# Patient Record
Sex: FEMALE | Race: WHITE | Employment: UNEMPLOYED | URBAN - METROPOLITAN AREA
[De-identification: names, ages, dates, MRNs, and addresses within clinical notes are randomized per-mention and may not be internally consistent; named-entity substitution may affect disease eponyms.]

---

## 2017-03-02 LAB
RPR, EXTERNAL: NON REACTIVE
TYPE, ABO & RH, EXTERNAL: NORMAL

## 2017-03-22 LAB
CHLAMYDIA, EXTERNAL: NEGATIVE
HBSAG, EXTERNAL: NEGATIVE
HIV, EXTERNAL: NEGATIVE
N. GONORRHEA, EXTERNAL: NEGATIVE
RUBELLA, EXTERNAL: NORMAL

## 2017-09-07 LAB — GRBS, EXTERNAL: NEGATIVE

## 2017-09-09 ENCOUNTER — HOSPITAL ENCOUNTER (EMERGENCY)
Age: 37
Discharge: HOME | End: 2017-09-09
Attending: OBSTETRICS & GYNECOLOGY | Admitting: OBSTETRICS & GYNECOLOGY
Payer: COMMERCIAL

## 2017-09-09 VITALS
HEART RATE: 80 BPM | SYSTOLIC BLOOD PRESSURE: 123 MMHG | BODY MASS INDEX: 28.28 KG/M2 | HEIGHT: 66 IN | DIASTOLIC BLOOD PRESSURE: 71 MMHG | WEIGHT: 176 LBS | TEMPERATURE: 98.8 F

## 2017-09-09 LAB
APPEARANCE UR: CLEAR
BILIRUB UR QL: NEGATIVE
COLOR UR: YELLOW
GLUCOSE UR QL STRIP.AUTO: NEGATIVE MG/DL
KETONES UR-MCNC: NEGATIVE MG/DL
LEUKOCYTE ESTERASE UR QL STRIP: NEGATIVE
NITRITE UR QL: NEGATIVE
PH UR: 6.5 [PH] (ref 5–9)
PROT UR QL: NEGATIVE MG/DL
RBC # UR STRIP: ABNORMAL /UL
SERVICE CMNT-IMP: ABNORMAL
SP GR UR: 1.01 (ref 1–1.02)
UROBILINOGEN UR QL: 1 EU/DL (ref 0.2–1)

## 2017-09-09 PROCEDURE — 99282 EMERGENCY DEPT VISIT SF MDM: CPT

## 2017-09-09 PROCEDURE — 59025 FETAL NON-STRESS TEST: CPT

## 2017-09-09 PROCEDURE — 81003 URINALYSIS AUTO W/O SCOPE: CPT

## 2017-09-09 RX ORDER — GLUCOSAMINE SULFATE 1500 MG
POWDER IN PACKET (EA) ORAL DAILY
COMMUNITY

## 2017-09-09 NOTE — IP AVS SNAPSHOT
Kamila Mccoy 
 
 
 [address] [place] [id number] 
[phone] Patient: Jose A Holloway MRN: [id number] DCB:[birth year]11[birth year] You are allergic to the following Not on File Recent Documentation Height Weight BMI OB Status Smoking Status 1.676 m 79.8 kg 28.41 kg/m2 Pregnant Never Smoker Emergency Contacts Name Discharge Info Relation Home Work Mobile GIL JENNINGS  Spouse [3] [phone] About your hospitalization You were admitted on:  N/A You last received care in the:  [place] [address] [facility] You were discharged on:  September 9, 2017 Unit phone number:  [phone] Why you were hospitalized Your primary diagnosis was:  Not on File Providers Seen During Your Hospitalizations Provider Role Specialty Primary office phone Carmen Trevino MD Attending Provider Obstetrics & Gynecology [phone] Your Primary Care Physician (PCP) ** None ** Follow-up Information None Current Discharge Medication List  
  
ASK your doctor about these medications Dose & Instructions Dispensing Information Comments Morning Noon Evening Bedtime  
 cholecalciferol 1,000 unit Cap Commonly known as:  VITAMIN D3 Your last dose was: Your next dose is: Take  by mouth daily. Refills:  0  
     
   
   
   
  
 ferrous sulfate 325 mg (65 mg iron) Cper Your last dose was: Your next dose is: Take  by mouth. Refills:  0  
     
   
   
   
  
 PNV No12-Iron-FA-DSS-OM-3 29 mg iron-1 mg -50 mg Cpkd Your last dose was: Your next dose is: Take  by mouth. Refills:  0 Discharge Instructions Marilyne Lower Contractions: Care Instructions Your Care Instructions [place] Leahy contractions prepare your uterus for labor.  Think of them as a \"warm-up\" exercise that your body does. You may begin to feel them between the 28th and 30th weeks of your pregnancy. But they start as early as the 20th week. [place] Leahy contractions usually occur more often during the ninth month. They may go away when you are active and return when you rest. These contractions are like mild contractions of true labor, but they occur less often. (You feel fewer than 8 in an hour.) They don't cause your cervix to open. It may be hard for you to tell the difference between [facility] contractions and true labor, especially in your first pregnancy. Follow-up care is a key part of your treatment and safety. Be sure to make and go to all appointments, and call your doctor if you are having problems. It's also a good idea to know your test results and keep a list of the medicines you take. How can you care for yourself at home? · Try a warm bath to help relieve muscle tension and reduce pain. · Change positions every 30 minutes. Take breaks if you must sit for a long time. Get up and walk around. · Drink plenty of water, enough so that your urine is light yellow or clear like water. · Taking short walks may help you feel better. Your doctor needs to check any contractions that are getting stronger or closer together. Where can you learn more? Go to http://alexander-pinky.info/. Enter [id number] in the search box to learn more about \"[place] Leahy Contractions: Care Instructions. \" Current as of: March 16, 2017 Content Version: 11.3 © 7530-0233 Exec. Care instructions adapted under license by "[employer] (which disclaims liability or warranty for this information). If you have questions about a medical condition or this instruction, always ask your healthcare professional. [place] [address] any warranty or liability for your use of this information. Counting Your Baby's Kicks: Care Instructions Your Care Instructions Counting your baby's kicks is one way your doctor can tell that your baby is healthy. Most womenespecially in a first pregnancyfeel their baby move for the first time between 16 and 22 weeks. The movement may feel like flutters rather than kicks. Your baby may move more at certain times of the day. When you are active, you may notice less kicking than when you are resting. At your prenatal visits, your doctor will ask whether the baby is active. In your last trimester, your doctor may ask you to count the number of times you feel your baby move. Follow-up care is a key part of your treatment and safety. Be sure to make and go to all appointments, and call your doctor if you are having problems. It's also a good idea to know your test results and keep a list of the medicines you take. How do you count fetal kicks? · A common method of checking your baby's movement is to count the number of kicks or moves you feel in 1 hour. Ten movements (such as kicks, flutters, or rolls) in 1 hour are normal. Some doctors suggest that you count in the morning until you get to 10 movements. Then you can quit for that day and start again the next day. · Pick your baby's most active time of day to count. This may be any time from morning to evening. · If you do not feel 10 movements in an hour, your baby may be sleeping. Wait for the next hour and count again. When should you call for help? Call your doctor now or seek immediate medical care if: 
· You noticed that your baby has stopped moving or is moving much less than normal. 
Watch closely for changes in your health, and be sure to contact your doctor if you have any problems. Where can you learn more? Go to http://alexander-pinky.info/. Enter X926 in the search box to learn more about \"Counting Your Baby's Kicks: Care Instructions. \" Current as of: March 16, 2017 Content Version: 11.3 © 9702-0312 Healthwise, Incorporated. Care instructions adapted under license by [employer] (which disclaims liability or warranty for this information). If you have questions about a medical condition or this instruction, always ask your healthcare professional. [address] any warranty or liability for your use of this information. Discharge Orders None Introducing [facility]! [employer] introduces [employer] patient portal. Now you can access parts of your medical record, email your doctor's office, and request medication refills online. 1. In your internet browser, go to [email or web address] 2. Click on the First Time User? Click Here link in the Sign In box. You will see the New Member Sign Up page. 3. Enter your [employer] Access Code exactly as it appears below. You will not need to use this code after youve completed the sign-up process. If you do not sign up before the expiration date, you must request a new code. · [employer] Access Code: [email or web address] Expires: 12/8/2017  9:03 PM 
 
4. Enter the last four digits of your Social Security Number (xxxx) and Date of Birth (mm/dd/yyyy) as indicated and click Submit. You will be taken to the next sign-up page. 5. Create a [employer] ID. This will be your [employer] login ID and cannot be changed, so think of one that is secure and easy to remember. 6. Create a [employer] password. You can change your password at any time. 7. Enter your Password Reset Question and Answer. This can be used at a later time if you forget your password. 8. Enter your e-mail address. You will receive e-mail notification when new information is available in [address] 9. Click Sign Up. You can now view and download portions of your medical record. 10. Click the Download Summary menu link to download a portable copy of your medical information. If you have questions, please visit the Frequently Asked Questions section of the MyChart website. Remember, MyChart is NOT to be used for urgent needs. For medical emergencies, dial 911. Now available from your iPhone and Android! General Information Please provide this summary of care documentation to your next provider. Patient Signature:  ____________________________________________________________ Date:  ____________________________________________________________  
  
[facility] Provider Signature:  ____________________________________________________________ Date:  ____________________________________________________________

## 2017-09-09 NOTE — IP AVS SNAPSHOT
[address] [place], [place] [address] 
[phone] Patient: Aide Perez MRN: [id number] UDN:8/96/6264 Current Discharge Medication List  
  
ASK your doctor about these medications Dose & Instructions Dispensing Information Comments Morning Noon Evening Bedtime  
 cholecalciferol 1,000 unit Cap Commonly known as:  VITAMIN D3 Your last dose was: Your next dose is: Take  by mouth daily. Refills:  0  
     
   
   
   
  
 ferrous sulfate 325 mg (65 mg iron) Cper Your last dose was: Your next dose is: Take  by mouth. Refills:  0  
     
   
   
   
  
 PNV No12-Iron-FA-DSS-OM-3 29 mg iron-1 mg -50 mg Cpkd Your last dose was: Your next dose is: Take  by mouth. Refills:  0

## 2017-09-09 NOTE — IP AVS SNAPSHOT
Summary of Care Report The Summary of Care report has been created to help improve care coordination. Users with access to [employer] or [address] (Web-based application) may access additional patient information including the Discharge Summary. If you are not currently a [address] user and need more information, please call the number listed below in the [place] [address] section and ask to be connected with Medical Records. Facility Information Name Address Phone [address] [place] [id number] [phone] Patient Information Patient Name Sex  Nestor Lamar ([id number]) Female 1980 Discharge Information Admitting Provider Service Area Unit Hung Hill MD / [phone] 5 [facility] / [phone] Discharge Provider Discharge Date/Time Discharge Disposition Destination (none) (none) (none) (none) Patient Language Language ENGLISH [13] You are allergic to the following Not on File Current Discharge Medication List  
  
ASK your doctor about these medications Dose & Instructions Dispensing Information Comments  
 cholecalciferol 1,000 unit Cap Commonly known as:  VITAMIN D3 Take  by mouth daily. Refills:  0  
   
 ferrous sulfate 325 mg (65 mg iron) Cper Take  by mouth. Refills:  0  
   
 PNV No12-Iron-FA-DSS-OM-3 29 mg iron-1 mg -50 mg Cpkd Take  by mouth. Refills:  0 Follow-up Information None Discharge Instructions Electa Halter Contractions: Care Instructions Your Care Instructions [place] Leahy contractions prepare your uterus for labor. Think of them as a \"warm-up\" exercise that your body does. You may begin to feel them between the 28th and 30th weeks of your pregnancy.  But they start as early as the 20th week. [place] Leahy contractions usually occur more often during the ninth month. They may go away when you are active and return when you rest. These contractions are like mild contractions of true labor, but they occur less often. (You feel fewer than 8 in an hour.) They don't cause your cervix to open. It may be hard for you to tell the difference between [facility] contractions and true labor, especially in your first pregnancy. Follow-up care is a key part of your treatment and safety. Be sure to make and go to all appointments, and call your doctor if you are having problems. It's also a good idea to know your test results and keep a list of the medicines you take. How can you care for yourself at home? · Try a warm bath to help relieve muscle tension and reduce pain. · Change positions every 30 minutes. Take breaks if you must sit for a long time. Get up and walk around. · Drink plenty of water, enough so that your urine is light yellow or clear like water. · Taking short walks may help you feel better. Your doctor needs to check any contractions that are getting stronger or closer together. Where can you learn more? Go to http://alexander-pinky.info/. Enter [phone] in the search box to learn more about \"[place] Leahy Contractions: Care Instructions. \" Current as of: March 16, 2017 Content Version: 11.3 © 6122-4500 Redeemr. Care instructions adapted under license by "[employer]" (which disclaims liability or warranty for this information). If you have questions about a medical condition or this instruction, always ask your healthcare professional. [address] any warranty or liability for your use of this information. Counting Your Baby's Kicks: Care Instructions Your Care Instructions Counting your baby's kicks is one way your doctor can tell that your baby is healthy. Most womenespecially in a first pregnancyfeel their baby move for the first time between 16 and 22 weeks. The movement may feel like flutters rather than kicks. Your baby may move more at certain times of the day. When you are active, you may notice less kicking than when you are resting. At your prenatal visits, your doctor will ask whether the baby is active. In your last trimester, your doctor may ask you to count the number of times you feel your baby move. Follow-up care is a key part of your treatment and safety. Be sure to make and go to all appointments, and call your doctor if you are having problems. It's also a good idea to know your test results and keep a list of the medicines you take. How do you count fetal kicks? · A common method of checking your baby's movement is to count the number of kicks or moves you feel in 1 hour. Ten movements (such as kicks, flutters, or rolls) in 1 hour are normal. Some doctors suggest that you count in the morning until you get to 10 movements. Then you can quit for that day and start again the next day. · Pick your baby's most active time of day to count. This may be any time from morning to evening. · If you do not feel 10 movements in an hour, your baby may be sleeping. Wait for the next hour and count again. When should you call for help? Call your doctor now or seek immediate medical care if: 
· You noticed that your baby has stopped moving or is moving much less than normal. 
Watch closely for changes in your health, and be sure to contact your doctor if you have any problems. Where can you learn more? Go to http://alexander-pinky.info/. Enter N308 in the search box to learn more about \"Counting Your Baby's Kicks: Care Instructions. \" Current as of: March 16, 2017 Content Version: 11.3 © 3442-4664 [employer].  Care instructions adapted under license by [address] (which disclaims liability or warranty for this information). If you have questions about a medical condition or this instruction, always ask your healthcare professional. [place] [address] any warranty or liability for your use of this information. Chart Review Routing History No Routing History on File

## 2017-09-10 NOTE — PROGRESS NOTES
HPI:    Subjective:     [unfilled], 42 y.o.   at 36w6d presents to L&D with c/o abdominal tightening. She denies vaginal bleeding/LOF/ctx. Assessment:  Past Medical History:   Diagnosis Date    Anemia     Essential hypertension     Gestational diabetes     Gestational hypertension      No past surgical history on file. Allergies not on file  Prior to Admission medications    Medication Sig Start Date End Date Taking? Authorizing Provider   PNV No12-Iron-FA-DSS-OM-3 29 mg iron-1 mg -50 mg CPKD Take  by mouth. Yes Historical Provider   cholecalciferol (VITAMIN D3) 1,000 unit cap Take  by mouth daily. Yes Historical Provider   ferrous sulfate 325 mg (65 mg iron) cpER Take  by mouth. Yes Historical Provider        Objective:     Vitals:  Vitals:    17   Temp:  98.8 °F (37.1 °C)   Weight: 79.8 kg (176 lb)    Height: 5' 6\" (1.676 m)         Physical Exam:  Patient without distress. Fundus: soft and non tender  Perineum: blood absent, amniotic fluid absent  Cervical Exam: 2 cm dilated    50% effaced    -2 station    Membranes:  Intact  Fetal Heart Rate: Reactive  Baseline: 130 per minute  Variability: moderate  Accelerations: yes  Decelerations: none  Uterine contractions: none  Cervical exam: Dilated:  2 cm                             Effacement: 50%                             Station: -2  U/A: Urine dipstick shows negative for all components. Assessment/Plan:     Plan: DC home with standard & ER labor precautions. Follow-up in office for routine visit.      Signed By:  Mic Lentz CNM     2017

## 2017-09-10 NOTE — PROGRESS NOTES
Received to L & D unit c/o ctx. , 36+6, GDM-diet control. Assisted to BR to change into a gown.  MAURO Styles CNM paged with quick return call. CNM informed of pt's arrival 36+6 with c/o abdominal tightness , reactive EFM tracing, SVE unchanged from previous exam in the office. Orders fro DC with labor precautions received.  Written and verbal DC instructions given to include Labor/SROM precautions, daily fetal kick counts, continue all prescribed medications and appointments. Verbalizes understanding. All questions answered. Up to get dressed.  DC'd to home ambulatory with .

## 2017-09-10 NOTE — DISCHARGE INSTRUCTIONS
Minor Degree Contractions: Care Instructions  Your Care Instructions  [place] Leahy contractions prepare your uterus for labor. Think of them as a \"warm-up\" exercise that your body does. You may begin to feel them between the 28th and 30th weeks of your pregnancy. But they start as early as the 20th week. [place] Leahy contractions usually occur more often during the ninth month. They may go away when you are active and return when you rest. These contractions are like mild contractions of true labor, but they occur less often. (You feel fewer than 8 in an hour.) They don't cause your cervix to open. It may be hard for you to tell the difference between [place] contractions and true labor, especially in your first pregnancy. Follow-up care is a key part of your treatment and safety. Be sure to make and go to all appointments, and call your doctor if you are having problems. It's also a good idea to know your test results and keep a list of the medicines you take. How can you care for yourself at home? · Try a warm bath to help relieve muscle tension and reduce pain. · Change positions every 30 minutes. Take breaks if you must sit for a long time. Get up and walk around. · Drink plenty of water, enough so that your urine is light yellow or clear like water. · Taking short walks may help you feel better. Your doctor needs to check any contractions that are getting stronger or closer together. Where can you learn more? Go to http://alexander-pinky.info/. Enter [phone] in the search box to learn more about \"[place] Leahy Contractions: Care Instructions. \"  Current as of: March 16, 2017  Content Version: 11.3  © 8262-4173 [employer] Strategy 1. Care instructions adapted under license by [employer] (which disclaims liability or warranty for this information).  If you have questions about a medical condition or this instruction, always ask your healthcare professional. [employer] disclaims any warranty or liability for your use of this information. Counting Your Baby's Kicks: Care Instructions  Your Care Instructions  Counting your baby's kicks is one way your doctor can tell that your baby is healthy. Most women--especially in a first pregnancy--feel their baby move for the first time between 16 and 22 weeks. The movement may feel like flutters rather than kicks. Your baby may move more at certain times of the day. When you are active, you may notice less kicking than when you are resting. At your prenatal visits, your doctor will ask whether the baby is active. In your last trimester, your doctor may ask you to count the number of times you feel your baby move. Follow-up care is a key part of your treatment and safety. Be sure to make and go to all appointments, and call your doctor if you are having problems. It's also a good idea to know your test results and keep a list of the medicines you take. How do you count fetal kicks? · A common method of checking your baby's movement is to count the number of kicks or moves you feel in 1 hour. Ten movements (such as kicks, flutters, or rolls) in 1 hour are normal. Some doctors suggest that you count in the morning until you get to 10 movements. Then you can quit for that day and start again the next day. · Pick your baby's most active time of day to count. This may be any time from morning to evening. · If you do not feel 10 movements in an hour, your baby may be sleeping. Wait for the next hour and count again. When should you call for help? Call your doctor now or seek immediate medical care if:  · You noticed that your baby has stopped moving or is moving much less than normal.  Watch closely for changes in your health, and be sure to contact your doctor if you have any problems. Where can you learn more? Go to http://alexander-pinky.info/.   Enter L120 in the search box to learn more about \"Counting Your Baby's Kicks: Care Instructions. \"  Current as of: March 16, 2017  Content Version: 11.3  © 4813-9184 [employer]. Care instructions adapted under license by [employer] (which disclaims liability or warranty for this information). If you have questions about a medical condition or this instruction, always ask your healthcare professional. [address] any warranty or liability for your use of this information.

## 2017-09-22 ENCOUNTER — ANESTHESIA EVENT (OUTPATIENT)
Dept: LABOR AND DELIVERY | Age: 37
End: 2017-09-22
Payer: COMMERCIAL

## 2017-09-22 ENCOUNTER — HOSPITAL ENCOUNTER (INPATIENT)
Age: 37
LOS: 2 days | Discharge: HOME | End: 2017-09-24
Attending: OBSTETRICS & GYNECOLOGY | Admitting: OBSTETRICS & GYNECOLOGY
Payer: COMMERCIAL

## 2017-09-22 ENCOUNTER — ANESTHESIA (OUTPATIENT)
Dept: LABOR AND DELIVERY | Age: 37
End: 2017-09-22
Payer: COMMERCIAL

## 2017-09-22 PROBLEM — Z33.1 IUP (INTRAUTERINE PREGNANCY), INCIDENTAL: Status: ACTIVE | Noted: 2017-09-22

## 2017-09-22 LAB
ABO + RH BLD: NORMAL
ALBUMIN SERPL-MCNC: 2.7 G/DL (ref 3.4–5)
ALBUMIN/GLOB SERPL: 0.8 {RATIO} (ref 0.8–1.7)
ALP SERPL-CCNC: 157 U/L (ref 45–117)
ALT SERPL-CCNC: 12 U/L (ref 13–56)
ANION GAP SERPL CALC-SCNC: 9 MMOL/L (ref 3–18)
AST SERPL-CCNC: 16 U/L (ref 15–37)
BILIRUB SERPL-MCNC: 0.3 MG/DL (ref 0.2–1)
BLOOD GROUP ANTIBODIES SERPL: NORMAL
BUN SERPL-MCNC: 6 MG/DL (ref 7–18)
BUN/CREAT SERPL: 8 (ref 12–20)
CALCIUM SERPL-MCNC: 8.6 MG/DL (ref 8.5–10.1)
CHLORIDE SERPL-SCNC: 105 MMOL/L (ref 100–108)
CO2 SERPL-SCNC: 26 MMOL/L (ref 21–32)
CREAT SERPL-MCNC: 0.73 MG/DL (ref 0.6–1.3)
ERYTHROCYTE [DISTWIDTH] IN BLOOD BY AUTOMATED COUNT: 14.7 % (ref 11.6–14.5)
GLOBULIN SER CALC-MCNC: 3.5 G/DL (ref 2–4)
GLUCOSE BLD STRIP.AUTO-MCNC: 63 MG/DL (ref 70–110)
GLUCOSE BLD STRIP.AUTO-MCNC: 66 MG/DL (ref 70–110)
GLUCOSE BLD STRIP.AUTO-MCNC: 75 MG/DL (ref 70–110)
GLUCOSE BLD STRIP.AUTO-MCNC: 83 MG/DL (ref 70–110)
GLUCOSE SERPL-MCNC: 69 MG/DL (ref 74–99)
GLUCOSE SERPL-MCNC: 98 MG/DL (ref 74–99)
HCT VFR BLD AUTO: 30.2 % (ref 35–45)
HGB BLD-MCNC: 9.9 G/DL (ref 12–16)
MCH RBC QN AUTO: 25.7 PG (ref 24–34)
MCHC RBC AUTO-ENTMCNC: 32.8 G/DL (ref 31–37)
MCV RBC AUTO: 78.4 FL (ref 74–97)
PLATELET # BLD AUTO: 153 K/UL (ref 135–420)
PMV BLD AUTO: 10.5 FL (ref 9.2–11.8)
POTASSIUM SERPL-SCNC: 2.8 MMOL/L (ref 3.5–5.5)
PROT SERPL-MCNC: 6.2 G/DL (ref 6.4–8.2)
RBC # BLD AUTO: 3.85 M/UL (ref 4.2–5.3)
SODIUM SERPL-SCNC: 140 MMOL/L (ref 136–145)
SPECIMEN EXP DATE BLD: NORMAL
URATE SERPL-MCNC: 4.2 MG/DL (ref 2.6–7.2)
WBC # BLD AUTO: 5.8 K/UL (ref 4.6–13.2)

## 2017-09-22 PROCEDURE — 74011250637 HC RX REV CODE- 250/637: Performed by: NURSE PRACTITIONER

## 2017-09-22 PROCEDURE — 00HU33Z INSERTION OF INFUSION DEVICE INTO SPINAL CANAL, PERCUTANEOUS APPROACH: ICD-10-PCS | Performed by: NURSE PRACTITIONER

## 2017-09-22 PROCEDURE — 75410000000 HC DELIVERY VAGINAL/SINGLE

## 2017-09-22 PROCEDURE — 85027 COMPLETE CBC AUTOMATED: CPT | Performed by: NURSE PRACTITIONER

## 2017-09-22 PROCEDURE — 76060000078 HC EPIDURAL ANESTHESIA

## 2017-09-22 PROCEDURE — 36415 COLL VENOUS BLD VENIPUNCTURE: CPT | Performed by: NURSE PRACTITIONER

## 2017-09-22 PROCEDURE — 74011000250 HC RX REV CODE- 250

## 2017-09-22 PROCEDURE — 74011250636 HC RX REV CODE- 250/636: Performed by: NURSE PRACTITIONER

## 2017-09-22 PROCEDURE — 75410000003 HC RECOV DEL/VAG/CSECN EA 0.5 HR

## 2017-09-22 PROCEDURE — 82947 ASSAY GLUCOSE BLOOD QUANT: CPT

## 2017-09-22 PROCEDURE — 86900 BLOOD TYPING SEROLOGIC ABO: CPT | Performed by: NURSE PRACTITIONER

## 2017-09-22 PROCEDURE — 77030034849

## 2017-09-22 PROCEDURE — 84550 ASSAY OF BLOOD/URIC ACID: CPT | Performed by: NURSE PRACTITIONER

## 2017-09-22 PROCEDURE — 77030007879 HC KT SPN EPDRL TELE -B: Performed by: ANESTHESIOLOGY

## 2017-09-22 PROCEDURE — 75410000002 HC LABOR FEE PER 1 HR

## 2017-09-22 PROCEDURE — 74011250636 HC RX REV CODE- 250/636

## 2017-09-22 PROCEDURE — 65270000029 HC RM PRIVATE

## 2017-09-22 PROCEDURE — 3E0R3CZ INTRODUCE REGIONAL ANESTH IN SPINAL CANAL, PERC: ICD-10-PCS | Performed by: NURSE PRACTITIONER

## 2017-09-22 PROCEDURE — 77010026065 HC OXYGEN MINIMUM MEDICAL AIR

## 2017-09-22 PROCEDURE — 82962 GLUCOSE BLOOD TEST: CPT

## 2017-09-22 PROCEDURE — 80053 COMPREHEN METABOLIC PANEL: CPT | Performed by: NURSE PRACTITIONER

## 2017-09-22 PROCEDURE — 77030018749 HC HK AMNIO DISP DERY -A

## 2017-09-22 RX ORDER — LIDOCAINE HYDROCHLORIDE AND EPINEPHRINE 15; 5 MG/ML; UG/ML
INJECTION, SOLUTION EPIDURAL AS NEEDED
Status: DISCONTINUED | OUTPATIENT
Start: 2017-09-22 | End: 2017-09-22 | Stop reason: HOSPADM

## 2017-09-22 RX ORDER — HYDROMORPHONE HYDROCHLORIDE 2 MG/ML
1 INJECTION, SOLUTION INTRAMUSCULAR; INTRAVENOUS; SUBCUTANEOUS
Status: DISCONTINUED | OUTPATIENT
Start: 2017-09-22 | End: 2017-09-23

## 2017-09-22 RX ORDER — FENTANYL CITRATE 50 UG/ML
INJECTION, SOLUTION INTRAMUSCULAR; INTRAVENOUS
Status: DISPENSED
Start: 2017-09-22 | End: 2017-09-23

## 2017-09-22 RX ORDER — FENTANYL CITRATE 50 UG/ML
100 INJECTION, SOLUTION INTRAMUSCULAR; INTRAVENOUS ONCE
Status: DISCONTINUED | OUTPATIENT
Start: 2017-09-22 | End: 2017-09-23 | Stop reason: HOSPADM

## 2017-09-22 RX ORDER — MINERAL OIL
30 OIL (ML) ORAL AS NEEDED
Status: COMPLETED | OUTPATIENT
Start: 2017-09-22 | End: 2017-09-22

## 2017-09-22 RX ORDER — NALBUPHINE HYDROCHLORIDE 10 MG/ML
10 INJECTION, SOLUTION INTRAMUSCULAR; INTRAVENOUS; SUBCUTANEOUS
Status: DISCONTINUED | OUTPATIENT
Start: 2017-09-22 | End: 2017-09-23 | Stop reason: HOSPADM

## 2017-09-22 RX ORDER — OXYTOCIN IN 5 % DEXTROSE 30/500 ML
.5-2 PLASTIC BAG, INJECTION (ML) INTRAVENOUS
Status: DISCONTINUED | OUTPATIENT
Start: 2017-09-22 | End: 2017-09-23

## 2017-09-22 RX ORDER — LIDOCAINE HYDROCHLORIDE 10 MG/ML
20 INJECTION, SOLUTION EPIDURAL; INFILTRATION; INTRACAUDAL; PERINEURAL AS NEEDED
Status: DISCONTINUED | OUTPATIENT
Start: 2017-09-22 | End: 2017-09-23

## 2017-09-22 RX ORDER — FENTANYL CITRATE 50 UG/ML
INJECTION, SOLUTION INTRAMUSCULAR; INTRAVENOUS AS NEEDED
Status: DISCONTINUED | OUTPATIENT
Start: 2017-09-22 | End: 2017-09-22 | Stop reason: HOSPADM

## 2017-09-22 RX ORDER — FENTANYL/ROPIVACAINE/NS/PF 2MCG/ML-.1
PLASTIC BAG, INJECTION (ML) EPIDURAL
Status: COMPLETED
Start: 2017-09-22 | End: 2017-09-22

## 2017-09-22 RX ORDER — IBUPROFEN 400 MG/1
800 TABLET ORAL
Status: DISCONTINUED | OUTPATIENT
Start: 2017-09-22 | End: 2017-09-24 | Stop reason: HOSPADM

## 2017-09-22 RX ORDER — METHYLERGONOVINE MALEATE 0.2 MG/ML
0.2 INJECTION INTRAVENOUS AS NEEDED
Status: DISCONTINUED | OUTPATIENT
Start: 2017-09-22 | End: 2017-09-23

## 2017-09-22 RX ORDER — OXYCODONE AND ACETAMINOPHEN 5; 325 MG/1; MG/1
2 TABLET ORAL
Status: DISCONTINUED | OUTPATIENT
Start: 2017-09-22 | End: 2017-09-24 | Stop reason: HOSPADM

## 2017-09-22 RX ORDER — BUTORPHANOL TARTRATE 2 MG/ML
2 INJECTION INTRAMUSCULAR; INTRAVENOUS
Status: DISCONTINUED | OUTPATIENT
Start: 2017-09-22 | End: 2017-09-23

## 2017-09-22 RX ORDER — OXYTOCIN/RINGER'S LACTATE 20/1000 ML
125 PLASTIC BAG, INJECTION (ML) INTRAVENOUS CONTINUOUS
Status: DISCONTINUED | OUTPATIENT
Start: 2017-09-22 | End: 2017-09-23 | Stop reason: HOSPADM

## 2017-09-22 RX ORDER — FENTANYL/ROPIVACAINE/NS/PF 2MCG/ML-.1
1-15 PLASTIC BAG, INJECTION (ML) EPIDURAL
Status: DISCONTINUED | OUTPATIENT
Start: 2017-09-22 | End: 2017-09-23 | Stop reason: HOSPADM

## 2017-09-22 RX ORDER — POTASSIUM CHLORIDE 20 MEQ/1
40 TABLET, EXTENDED RELEASE ORAL
Status: COMPLETED | OUTPATIENT
Start: 2017-09-22 | End: 2017-09-22

## 2017-09-22 RX ORDER — TERBUTALINE SULFATE 1 MG/ML
0.25 INJECTION SUBCUTANEOUS
Status: DISCONTINUED | OUTPATIENT
Start: 2017-09-22 | End: 2017-09-23

## 2017-09-22 RX ORDER — OXYTOCIN/RINGER'S LACTATE 20/1000 ML
500 PLASTIC BAG, INJECTION (ML) INTRAVENOUS ONCE
Status: COMPLETED | OUTPATIENT
Start: 2017-09-22 | End: 2017-09-22

## 2017-09-22 RX ORDER — DIPHENHYDRAMINE HYDROCHLORIDE 50 MG/ML
25 INJECTION, SOLUTION INTRAMUSCULAR; INTRAVENOUS
Status: DISCONTINUED | OUTPATIENT
Start: 2017-09-22 | End: 2017-09-23 | Stop reason: HOSPADM

## 2017-09-22 RX ORDER — LIDOCAINE HYDROCHLORIDE 10 MG/ML
INJECTION INFILTRATION; PERINEURAL
Status: DISCONTINUED
Start: 2017-09-22 | End: 2017-09-23

## 2017-09-22 RX ORDER — LIDOCAINE HYDROCHLORIDE 10 MG/ML
INJECTION INFILTRATION; PERINEURAL AS NEEDED
Status: DISCONTINUED | OUTPATIENT
Start: 2017-09-22 | End: 2017-09-22 | Stop reason: HOSPADM

## 2017-09-22 RX ORDER — SODIUM CHLORIDE, SODIUM LACTATE, POTASSIUM CHLORIDE, CALCIUM CHLORIDE 600; 310; 30; 20 MG/100ML; MG/100ML; MG/100ML; MG/100ML
125 INJECTION, SOLUTION INTRAVENOUS CONTINUOUS
Status: DISCONTINUED | OUTPATIENT
Start: 2017-09-22 | End: 2017-09-23

## 2017-09-22 RX ORDER — BUPIVACAINE HYDROCHLORIDE 2.5 MG/ML
INJECTION, SOLUTION EPIDURAL; INFILTRATION; INTRACAUDAL AS NEEDED
Status: DISCONTINUED | OUTPATIENT
Start: 2017-09-22 | End: 2017-09-22 | Stop reason: HOSPADM

## 2017-09-22 RX ORDER — NALBUPHINE HYDROCHLORIDE 10 MG/ML
10 INJECTION, SOLUTION INTRAMUSCULAR; INTRAVENOUS; SUBCUTANEOUS
Status: DISCONTINUED | OUTPATIENT
Start: 2017-09-22 | End: 2017-09-23

## 2017-09-22 RX ORDER — NALOXONE HYDROCHLORIDE 0.4 MG/ML
0.2 INJECTION, SOLUTION INTRAMUSCULAR; INTRAVENOUS; SUBCUTANEOUS AS NEEDED
Status: DISCONTINUED | OUTPATIENT
Start: 2017-09-22 | End: 2017-09-23 | Stop reason: HOSPADM

## 2017-09-22 RX ORDER — OXYTOCIN IN 5 % DEXTROSE 30/500 ML
.5-2 PLASTIC BAG, INJECTION (ML) INTRAVENOUS
Status: CANCELLED | OUTPATIENT
Start: 2017-09-22

## 2017-09-22 RX ORDER — LORATADINE 10 MG/1
10 TABLET ORAL ONCE
Status: COMPLETED | OUTPATIENT
Start: 2017-09-22 | End: 2017-09-22

## 2017-09-22 RX ORDER — ACETAMINOPHEN 325 MG/1
650 TABLET ORAL
Status: DISCONTINUED | OUTPATIENT
Start: 2017-09-22 | End: 2017-09-24 | Stop reason: HOSPADM

## 2017-09-22 RX ADMIN — LORATADINE 10 MG: 10 TABLET ORAL at 20:48

## 2017-09-22 RX ADMIN — BUPIVACAINE HYDROCHLORIDE 4 ML: 2.5 INJECTION, SOLUTION EPIDURAL; INFILTRATION; INTRACAUDAL at 18:19

## 2017-09-22 RX ADMIN — Medication 12 ML/HR: at 18:34

## 2017-09-22 RX ADMIN — LIDOCAINE HYDROCHLORIDE 3 ML: 10 INJECTION INFILTRATION; PERINEURAL at 18:12

## 2017-09-22 RX ADMIN — Medication 2 MILLI-UNITS/MIN: at 20:06

## 2017-09-22 RX ADMIN — SODIUM CHLORIDE, SODIUM LACTATE, POTASSIUM CHLORIDE, AND CALCIUM CHLORIDE 125 ML/HR: 600; 310; 30; 20 INJECTION, SOLUTION INTRAVENOUS at 18:44

## 2017-09-22 RX ADMIN — Medication 10000 MILLI-UNITS/HR: at 22:32

## 2017-09-22 RX ADMIN — POTASSIUM CHLORIDE 40 MEQ: 20 TABLET, EXTENDED RELEASE ORAL at 13:18

## 2017-09-22 RX ADMIN — Medication 30 ML: at 22:19

## 2017-09-22 RX ADMIN — SODIUM CHLORIDE, SODIUM LACTATE, POTASSIUM CHLORIDE, AND CALCIUM CHLORIDE 125 ML/HR: 600; 310; 30; 20 INJECTION, SOLUTION INTRAVENOUS at 16:51

## 2017-09-22 RX ADMIN — SODIUM CHLORIDE, SODIUM LACTATE, POTASSIUM CHLORIDE, AND CALCIUM CHLORIDE 1000 ML: 600; 310; 30; 20 INJECTION, SOLUTION INTRAVENOUS at 12:50

## 2017-09-22 RX ADMIN — LIDOCAINE HYDROCHLORIDE AND EPINEPHRINE 3 ML: 15; 5 INJECTION, SOLUTION EPIDURAL at 18:17

## 2017-09-22 RX ADMIN — FENTANYL CITRATE 100 MCG: 50 INJECTION, SOLUTION INTRAMUSCULAR; INTRAVENOUS at 18:19

## 2017-09-22 NOTE — PROGRESS NOTES
Labor Progress Note  Patient seen, fetal heart rate and contraction pattern evaluated, patient examined. No data found. Physical Exam:  Cervical Exam:  6 cm dilated    80% effaced    -2 station    Membranes:  Artificial Rupture of Membranes; Amniotic Fluid: large amount of clear fluid  Uterine Activity: Frequency: Every 2 minutes  Fetal Heart Rate: Reactive    Assessment/Plan:  Reassuring fetal status, Labor  Progressing normally, Continue plan for vaginal delivery, pt desires epidural and this will be called for.

## 2017-09-22 NOTE — IP AVS SNAPSHOT
[address] [address] 
[phone] Patient: Nerissa Bird MRN: [id number] OHX:0/86/7468 You are allergic to the following No active allergies Recent Documentation Height Weight BMI OB Status Smoking Status 1.676 m 80.3 kg 28.57 kg/m2 Pregnant Never Smoker Emergency Contacts Name Discharge Info Relation Home Work Mobile GIL JENNINGS  Spouse [3] [phone] About your hospitalization You were admitted on:  September 22, 2017 You last received care in the:  [address] [place] [address] You were discharged on:  September 24, 2017 Unit phone number:  [phone] Why you were hospitalized Your primary diagnosis was:  Not on File Your diagnoses also included:  Iup (Intrauterine Pregnancy), Incidental  
  
  
 
  
  
Providers Seen During Your Hospitalizations Provider Role Specialty Primary office phone Tony Galeano MD Attending Provider Obstetrics & Gynecology [phone] Desiree Trejo MD Attending Provider Obstetrics & Gynecology [phone] Your Primary Care Physician (PCP) Primary Care Physician Office Phone Office Fax NONE ** None ** ** None ** Follow-up Information Follow up With Details Comments Contact Info None   None ([phone]) Patient stated that they have no PCP Cesar Ivory CNM Schedule an appointment as soon as possible for a visit in 6 weeks  [address] [place] [address] [address] [place] [address] 
[phone] Current Discharge Medication List  
  
START taking these medications Dose & Instructions Dispensing Information Comments Morning Noon Evening Bedtime  
 ibuprofen 800 mg tablet Commonly known as:  MOTRIN Your last dose was: Your next dose is:    
   
   
 Dose:  800 mg Take 1 Tab by mouth every eight (8) hours as needed. Quantity:  90 Tab Refills:  1 CONTINUE these medications which have NOT CHANGED Dose & Instructions Dispensing Information Comments Morning Noon Evening Bedtime  
 cholecalciferol 1,000 unit Cap Commonly known as:  VITAMIN D3 Your last dose was: Your next dose is: Take  by mouth daily. Refills:  0  
     
   
   
   
  
 ferrous sulfate 325 mg (65 mg iron) Cper Your last dose was: Your next dose is: Take  by mouth. Refills:  0  
     
   
   
   
  
 PNV No12-Iron-FA-DSS-OM-3 29 mg iron-1 mg -50 mg Cpkd Your last dose was: Your next dose is: Take  by mouth. Refills:  0 Where to Get Your Medications Information on where to get these meds will be given to you by the nurse or doctor. ! Ask your nurse or doctor about these medications  
  ibuprofen 800 mg tablet Discharge Instructions POST DELIVERY DISCHARGE INSTRUCTIONS Name: Yojana Tobin YOB: 1980 Primary Diagnosis: Active Problems: 
  IUP (intrauterine pregnancy), incidental (9/22/2017) General:  
 
Diet/Diet Restrictions: 
Eight 8-ounce glasses of fluid daily (water, juices); avoid excessive caffeine intake. Meals/snacks as desired which are high in fiber and carbohydrates and low in fat and cholesterol. Physical Activity / Restrictions / Safety:  
 
Avoid heavy lifting, no more than the baby alone (not the baby in the car seat). Avoid intercourse until you are seen at your postpartum visit. No douching or tampon use. Check with obstetrician before starting or resuming an exercise program.    
 
 
Discharge Instructions/Special Treatment/Home Care Needs:  
 
Continue prenatal vitamins. Continue to use squirt bottle with warm water on your perineum after each bathroom use until bleeding stops. Call your doctor for the following:  
 
Fever over 101 degrees by mouth. Vaginal bleeding that soaks two pads per hour for more than one hour. Red streaks or increased swelling of legs, painful red streaks on your breast. 
If you feel extremely anxious or overwhelmed. If you have thoughts of harming yourself and/or your baby. Pain Management:  
 
Pain Management:  
Take Acetaminophen (Tylenol) or Ibuprofen (Advil, Motrin), as directed for pain. Use a warm Sitz bath 3 times daily to relieve perineal or hemorrhoidal discomfort. For hemorrhoidal discomfort, use Tucks and Anusol cream as needed and directed. Follow-Up Care:  
 
Appointment with MD: Follow-up Appointments Procedures  FOLLOW UP VISIT Appointment in: 6 Weeks Standing Status:   Standing Number of Occurrences:   1 Order Specific Question:   Appointment in Answer:   6 Weeks Telephone number: [phone] [employer] Activation Thank you for requesting access to [facility]. Please follow the instructions below to securely access and download your online medical record. [facility] allows you to send messages to your doctor, view your test results, renew your prescriptions, schedule appointments, and more. How Do I Sign Up? 1. In your internet browser, go to [email or web address]. 2. Click on the First Time User? Click Here link in the Sign In box. You will see the New Member Sign Up page. 3. Enter your [facility] Access Code exactly as it appears below. You will not need to use this code after youve completed the sign-up process. If you do not sign up before the expiration date, you must request a new code. [facility] Access Code: [email or web address] Expires: 2017  9:03 PM (This is the date your [facility] access code will ) 4. Enter the last four digits of your Social Security Number (xxxx) and Date of Birth (mm/dd/yyyy) as indicated and click Submit. You will be taken to the next sign-up page. 5. Create a [facility] ID.  This will be your [facility] login ID and cannot be changed, so think of one that is secure and easy to remember. 6. Create a [facility] password. You can change your password at any time. 7. Enter your Password Reset Question and Answer. This can be used at a later time if you forget your password. 8. Enter your e-mail address. You will receive e-mail notification when new information is available in [address] 9. Click Sign Up. You can now view and download portions of your medical record. 10. Click the Download Summary menu link to download a portable copy of your medical information. Additional Information If you have questions, please visit the Frequently Asked Questions section of the [employer] website at [email or web address] Remember, [facility] is NOT to be used for urgent needs. For medical emergencies, dial 911. Patient armband removed and given to patient to take home. Patient was informed of the privacy risks if armband lost or stolen Signed By: Yasir Zamora, MAYNOR Discharge Orders None Introducing [facility]! Johanna Alberto introduces [facility] patient portal. Now you can access parts of your medical record, email your doctor's office, and request medication refills online. 1. In your internet browser, go to [email or web address] 2. Click on the First Time User? Click Here link in the Sign In box. You will see the New Member Sign Up page. 3. Enter your [facility] Access Code exactly as it appears below. You will not need to use this code after youve completed the sign-up process. If you do not sign up before the expiration date, you must request a new code. · [facility] Access Code: [email or web address] Expires: 12/8/2017  9:03 PM 
 
4. Enter the last four digits of your Social Security Number (xxxx) and Date of Birth (mm/dd/yyyy) as indicated and click Submit.  You will be taken to the next sign-up page. 5. Create a [employer] [facility] ID. This will be your [facility] login ID and cannot be changed, so think of one that is secure and easy to remember. 6. Create a [employer] [facility] password. You can change your password at any time. 7. Enter your Password Reset Question and Answer. This can be used at a later time if you forget your password. 8. Enter your e-mail address. You will receive e-mail notification when new information is available in [address] 9. Click Sign Up. You can now view and download portions of your medical record. 10. Click the Download Summary menu link to download a portable copy of your medical information. If you have questions, please visit the Frequently Asked Questions section of the [employer] [facility] website. Remember, [facility] is NOT to be used for urgent needs. For medical emergencies, dial 911. Now available from your iPhone and Android! General Information Please provide this summary of care documentation to your next provider. Patient Signature:  ____________________________________________________________ Date:  ____________________________________________________________  
  
Beryl Lewis Provider Signature:  ____________________________________________________________ Date:  ____________________________________________________________

## 2017-09-22 NOTE — PROGRESS NOTES
1005 G P at 38+5 weeks gestation sent to triage from physician office for increased blood pressures. States she was 3/80/-3 in office. 1112 SVE 4/90/-3 by this RN  1209 SVE by David Talavera CNM 3-4/50/-3; using bedside ultrasound to determine fetal position. [facility], [facility] pressures, and ordered oral k-dur potassium. Order entered, See [facility] and orders. (Please note, between , patient ambulated in halls in between intermittent fetal monitoring)  1515 Glucose level of 63 (repeated 66). Patient given a popsicle. [facility] notified of patient's glucose. No new orders at this time. 1557 After patient had popsicle, repeat blood glucose was 75  1600 SVE 5/80/-2  1650 AROM by David Talavera CNM for moderate amount clear fluid, SVE 6/80/-2  1655 Patient requesting epidural anesthesia, IV fluid bolus began  1710 Dr. Petra gonzales for epidural placement  1727 Dr. Petra gonzales for epidural placement    1804 Dr. Petra Villegas at bedside explaining epidural procedure, and consent is signed. 1810 Time out performed, Fentanyl handed to Dr. Nilda Brar Procedure begins  1814 Epidural needle being placed  1815 Saline inserted by Dr. Petra Villegas  1816 Epidural catheter threaded into place  1817 Test dose given  1818 Procedure is complete, Taping catheter in place  1820 Fentanyl being administered by Dr. Tabatha Mirza 96 6/80/-2  1835 Blum catheter inserted  1923 Bedside/verbal report given to Sophy Yepez RN.

## 2017-09-22 NOTE — H&P
History & Physical    Name: Aurora Gordon MRN: [id number]  SSN: [id number]    YOB: 1980  Age: 42 y.o. Sex: female        Subjective:     Estimated Date of Delivery: 10/1/17  OB History      Para Term  AB Living    5 4        SAB TAB Ectopic Molar Multiple Live Births                     Ms. Artie Cardona is admitted with pregnancy at 38w5d for active labor. Prenatal course was complicated by R1SLV and AMA. Please see prenatal records for details. No Known Allergies    Objective:     Vitals:  Vitals:    17 1231 17 1302 17 1331 17 1346   BP: 115/75 (!) 100/39 129/79 124/73   Pulse: 76 65 75 82   Resp:       Temp:       Weight:       Height:            Physical Exam:  Patient without distress. Heart: Regular rate and rhythm  Lung: clear to auscultation throughout lung fields, no wheezes, no rales, no rhonchi and normal respiratory effort  Abdomen: soft, nontender  Fundus: soft and non tender  Perineum: blood absent, amniotic fluid absent  Cervical Exam: 4 cm dilated    70% effaced    -3 station    Presenting Part: cephalic  Lower Extremities: no evidence of DVT  presenting part cephalic confirmed by ultrasound  Membranes:  Intact  Fetal Heart Rate & Contraction pattern: Reactive    Prenatal Labs:   No results found for: RUBELLAEXT, GRBSEXT, HBSAGEXT, HIVEXT, RPREXT, GONNOEXT, CHLAMEXT      Assessment/Plan:     Plan: Admit for Reassuring fetal status, Continue plan for vaginal delivery. Group B Strep was negative. Dr. Dimas Mcknight aware of admission.     Signed By:  Yue Rudolph CNM     2017

## 2017-09-22 NOTE — ANESTHESIA PROCEDURE NOTES
Epidural Block    Start time: 9/22/2017 6:10 PM  End time: 9/22/2017 6:16 PM  Performed by: Ashley Francois  Authorized by: Ashley Francois     Pre-Procedure  Indication: at surgeon's request and labor epidural    Preanesthetic Checklist: patient identified, risks and benefits discussed, anesthesia consent, site marked, patient being monitored, timeout performed and anesthesia consent    Timeout Time: 18:10        Epidural:   Patient position:  Seated  Prep region:  Lumbar  Prep: Patient draped and Chlorhexidine    Location:  L3-4    Needle and Epidural Catheter:   Needle Type:  Tuohy  Needle Gauge:  17 G  Injection Technique:  Loss of resistance using air  Attempts:  1  Catheter Size:  20 G  Catheter at Skin Depth (cm):  10  Depth in Epidural Space (cm):  5  Events: no blood with aspiration, no cerebrospinal fluid with aspiration, no paresthesia and negative aspiration test    Test Dose:  Lidocaine 1.5% w/ epi and positive    Assessment:   Catheter Secured:  Tegaderm and tape (filter)  Insertion:  Uncomplicated  Patient tolerance:  Patient tolerated the procedure well with no immediate complications

## 2017-09-22 NOTE — IP AVS SNAPSHOT
Kamila Mccoy 
 
 
 [address] [place] [address] 
[phone] Patient: Jose A Holloway MRN: [id number] CLS:7/36/3068 Current Discharge Medication List  
  
START taking these medications Dose & Instructions Dispensing Information Comments Morning Noon Evening Bedtime  
 ibuprofen 800 mg tablet Commonly known as:  MOTRIN Your last dose was: Your next dose is:    
   
   
 Dose:  800 mg Take 1 Tab by mouth every eight (8) hours as needed. Quantity:  90 Tab Refills:  1 CONTINUE these medications which have NOT CHANGED Dose & Instructions Dispensing Information Comments Morning Noon Evening Bedtime  
 cholecalciferol 1,000 unit Cap Commonly known as:  VITAMIN D3 Your last dose was: Your next dose is: Take  by mouth daily. Refills:  0  
     
   
   
   
  
 ferrous sulfate 325 mg (65 mg iron) Cper Your last dose was: Your next dose is: Take  by mouth. Refills:  0  
     
   
   
   
  
 PNV No12-Iron-FA-DSS-OM-3 29 mg iron-1 mg -50 mg Cpkd Your last dose was: Your next dose is: Take  by mouth. Refills:  0 Where to Get Your Medications Information on where to get these meds will be given to you by the nurse or doctor. ! Ask your nurse or doctor about these medications  
  ibuprofen 800 mg tablet

## 2017-09-22 NOTE — PROGRESS NOTES
Bedside and Verbal shift change report given to Marcelino Ahmadi RN (oncoming nurse) by Byron Peraza, RN (offgoing nurse). Report included the following information SBAR, Kardex, Intake/Output and MAR.      patient resting tilted to right side with peanut ball between legs. Denies needs. At this time.  iupc placed by david bhardwaj     orders received to start pitocin     patient turned to left lateral with peanut ball     7-8/90/-1 to 0 per MAURICIO Pa patient in high fowlers. Us adjusted.  SVE 9/100/0 patient assisted to right lateral us adjusted.  lr bolus     MAURICIO Pa at bedside. Patient assisted to left lateral, US adjusted     O2 at 10L/min     peanut ball placed between legs. DAVID reviewed strip, patient denies needs. [address] at bedside, patient feeling pressure, O2 removed. LR bolus returned to 125ml/hr     patient pushing with Christian Sizer, DAVID and RN at bedside continuously evaluating  [facility] until delivery. 2232  of viable male infant, loose nuchal x1 reduced, shoulders delivered without difficulty, spontaneous cry noted, infant dried and placed skin to skin on mothers chest, delayed cord clamping, cord clamped and cut by FOB. Infant remains skin to skin    2236  of intact normal looking placenta    2240 pericare provided, pads and icepack applied. Patient encouraged to breastfeed within first hour due to GDM, denies need for breastfeeding assistance    2330 pt states she would like to bottle feed until \"more milk comes in\" patient encourage to latch infant, will bring enfamil per patient request.     2335 infant latched to right breast    2344  Patient states infant only stay latched \"for a few min\" supplemented with 15ml of formula    0015 patient assisted to restroom with minimal assistance, unable to void at this time, pericare provided, pad, icepack and gown changed.  Patient returned to bed.     0200 TRANSFER - OUT REPORT:    Verbal report given to Waldo Dozier RN(name) on Sophy Leung  being transferred to [facility] (unit) for routine progression of care       Report consisted of patients Situation, Background, Assessment and   Recommendations(SBAR). Information from the following report(s) SBAR, Kardex, Intake/Output and MAR was reviewed with the receiving nurse. Lines:   Peripheral IV 09/22/17 Left Arm (Active)   Site Assessment Clean, dry, & intact 9/22/2017  7:23 PM   Phlebitis Assessment 0 9/22/2017  7:23 PM   Infiltration Assessment 0 9/22/2017  7:23 PM   Dressing Status Clean, dry, & intact 9/22/2017  7:23 PM   Dressing Type Tape;Transparent 9/22/2017  7:23 PM   Hub Color/Line Status Infusing;Green 9/22/2017  7:23 PM   Alcohol Cap Used Yes 9/22/2017  7:23 PM        Opportunity for questions and clarification was provided.       Patient transported with:   Registered Nurse

## 2017-09-22 NOTE — ANESTHESIA PREPROCEDURE EVALUATION
Anesthetic History   No history of anesthetic complications            Review of Systems / Medical History  Patient summary reviewed, nursing notes reviewed and pertinent labs reviewed    Pulmonary  Within defined limits                 Neuro/Psych   Within defined limits           Cardiovascular    Hypertension            Pertinent negatives: No past MI, CAD, PAD, dysrhythmias, angina and CHF  Exercise tolerance: >4 METS  Comments: Gestational HTN   GI/Hepatic/Renal     GERD        Pertinent negatives: No hepatitis, liver disease and renal disease  Comments: GERD during pregnancy Endo/Other    Diabetes      Pertinent negatives: No hypothyroidism, hyperthyroidism, obesity and blood dyscrasia  Comments: GDM Other Findings              Physical Exam    Airway  Mallampati: II  TM Distance: 4 - 6 cm  Neck ROM: normal range of motion   Mouth opening: Normal     Cardiovascular  Regular rate and rhythm,  S1 and S2 normal,  no murmur, click, rub, or gallop             Dental  No notable dental hx       Pulmonary  Breath sounds clear to auscultation               Abdominal  GI exam deferred       Other Findings            Anesthetic Plan    ASA: 2  Anesthesia type: epidural            Anesthetic plan and risks discussed with: Patient and Spouse      Labor epidural

## 2017-09-22 NOTE — PROGRESS NOTES
Labor Progress Note  Patient seen, fetal heart rate and contraction pattern evaluated, patient examined.   Patient Vitals for the past 1 hrs:   BP Temp Pulse Resp SpO2   09/22/17 1919 - 98 °F (36.7 °C) - 16 -   09/22/17 1918 - - - - 100 %   09/22/17 1913 - - - - 100 %   09/22/17 1908 - - - - 100 %   09/22/17 1903 - - - - 100 %   09/22/17 1900 130/83 - 78 - -   09/22/17 1858 - - - - 95 %   09/22/17 1852 - - - - 100 %       Physical Exam:  Cervical Exam:  6 cm dilated    80% effaced    -2 station    Presenting Part: cephalic  Membranes:  continues to leak clear fluid  Uterine Activity: Frequency: Every q 6 minutes, 30-40mmHg  Fetal Heart Rate: Reactive    Assessment/Plan:  Reassuring fetal status, Labor  Not progressing normally  start pitocin augmentation, Continue plan for vaginal delivery

## 2017-09-22 NOTE — PROGRESS NOTES
Labor Progress Note  Patient seen, fetal heart rate and contraction pattern evaluated, patient examined. No data found. Physical Exam:  Cervical Exam:  6 cm dilated    80% effaced    -2 station    Membranes:  continues to leak clear fluid  Uterine Activity: Frequency: Every 2-4 minutes  Fetal Heart Rate: Reactive    Assessment/Plan:  Continue to plan for vaginal delivery, consider augmentation with pitocin if no change over next 1 hour. Pt contractions are spacing out and she has not made adequate progress.

## 2017-09-23 LAB
HCT VFR BLD AUTO: 28.7 % (ref 35–45)
HGB BLD-MCNC: 9.3 G/DL (ref 12–16)

## 2017-09-23 PROCEDURE — 65270000029 HC RM PRIVATE

## 2017-09-23 PROCEDURE — 85018 HEMOGLOBIN: CPT | Performed by: OBSTETRICS & GYNECOLOGY

## 2017-09-23 PROCEDURE — 85014 HEMATOCRIT: CPT | Performed by: OBSTETRICS & GYNECOLOGY

## 2017-09-23 PROCEDURE — 74011250637 HC RX REV CODE- 250/637: Performed by: NURSE PRACTITIONER

## 2017-09-23 PROCEDURE — 36415 COLL VENOUS BLD VENIPUNCTURE: CPT | Performed by: OBSTETRICS & GYNECOLOGY

## 2017-09-23 RX ORDER — IBUPROFEN 800 MG/1
800 TABLET ORAL
Qty: 90 TAB | Refills: 1 | Status: SHIPPED | OUTPATIENT
Start: 2017-09-23

## 2017-09-23 RX ORDER — PROMETHAZINE HYDROCHLORIDE 25 MG/ML
25 INJECTION, SOLUTION INTRAMUSCULAR; INTRAVENOUS
Status: DISCONTINUED | OUTPATIENT
Start: 2017-09-23 | End: 2017-09-24 | Stop reason: HOSPADM

## 2017-09-23 RX ORDER — ZOLPIDEM TARTRATE 5 MG/1
5 TABLET ORAL
Status: DISCONTINUED | OUTPATIENT
Start: 2017-09-23 | End: 2017-09-24 | Stop reason: HOSPADM

## 2017-09-23 RX ORDER — DOCUSATE SODIUM 100 MG/1
100 CAPSULE, LIQUID FILLED ORAL
Status: DISCONTINUED | OUTPATIENT
Start: 2017-09-23 | End: 2017-09-24 | Stop reason: HOSPADM

## 2017-09-23 RX ORDER — AMOXICILLIN 250 MG
1 CAPSULE ORAL
Status: DISCONTINUED | OUTPATIENT
Start: 2017-09-23 | End: 2017-09-24 | Stop reason: HOSPADM

## 2017-09-23 RX ADMIN — IBUPROFEN 800 MG: 400 TABLET, FILM COATED ORAL at 16:51

## 2017-09-23 RX ADMIN — IBUPROFEN 800 MG: 400 TABLET, FILM COATED ORAL at 06:49

## 2017-09-23 NOTE — PROGRESS NOTES
Bedside and Verbal shift change report given to JOSE Flores RN (oncoming nurse) by Elizabeth Rendon RN (offgoing nurse). Report included the following information SBAR, Kardex, Intake/Output, MAR and Recent Results.

## 2017-09-23 NOTE — PROGRESS NOTES
Progress Note    Patient: Tabatha Puga MRN: [id number]     YOB: 1980  Age: 42 y.o. Subjective:     Postpartum Day: 1    The patient is feeling well. Pain is  well controlled with current medications. Urinary output is adequate. Baby is feeding via bottle without difficulty. Objective:      Patient Vitals for the past 12 hrs:   Temp Pulse Resp BP SpO2   09/23/17 0622 98.2 °F (36.8 °C) 68 16 106/67 100 %   09/23/17 0210 98.8 °F (37.1 °C) 78 18 111/71 100 %   09/23/17 0146 98.4 °F (36.9 °C) 77 14 128/78 -       General:    alert, cooperative, no distress   Lochia:  appropriate   Uterine Fundus:   firm @ umbilicus    Perineum:  Intact    DVT Evaluation:  No evidence of DVT seen on physical exam.  Negative Brianna's sign. Lab/Data Review:  Recent Results (from the past 24 hour(s))   GLUCOSE, POC    Collection Time: 09/22/17  3:10 PM   Result Value Ref Range    Glucose (POC) 63 (L) 70 - 110 mg/dL   GLUCOSE, POC    Collection Time: 09/22/17  3:11 PM   Result Value Ref Range    Glucose (POC) 66 (L) 70 - 110 mg/dL   GLUCOSE, POC    Collection Time: 09/22/17  3:57 PM   Result Value Ref Range    Glucose (POC) 75 70 - 110 mg/dL   GLUCOSE, RANDOM    Collection Time: 09/22/17  6:56 PM   Result Value Ref Range    Glucose 69 (L) 74 - 99 mg/dL   GLUCOSE, POC    Collection Time: 09/22/17  8:09 PM   Result Value Ref Range    Glucose (POC) 83 70 - 110 mg/dL   HEMATOCRIT    Collection Time: 09/23/17  4:46 AM   Result Value Ref Range    HCT 28.7 (L) 35.0 - 45.0 %   HEMOGLOBIN    Collection Time: 09/23/17  4:46 AM   Result Value Ref Range    HGB 9.3 (L) 12.0 - 16.0 g/dL     All lab results for the last 24 hours reviewed. Assessment:     Delivery: spontaneous vaginal delivery    Plan:     Doing well postpartum vaginal delivery. Continue current postpartum care. Encouraged hydration, nutrition and ambulation. Plan DC home tomorrow.     Signed By: Rody Navas CNM     September 23, 2017

## 2017-09-23 NOTE — PROGRESS NOTES
TRANSFER - IN REPORT:    Verbal report received from Magali Berry RN (name) on Peg Speck  being received from L&D (unit) for routine progression of care      Report consisted of patients Situation, Background, Assessment and   Recommendations(SBAR). Information from the following report(s) SBAR, Kardex, Intake/Output, MAR and Recent Results was reviewed with the receiving nurse. Opportunity for questions and clarification was provided. Assessment completed upon patients arrival to unit and care assumed. Patient oriented to postpartum unit, questions and concerns addressed. Green light checklist complete. Infant and FOB at bedside. Call light within reach.

## 2017-09-23 NOTE — PROGRESS NOTES
Delivery Note    Obstetrician:  Henna Moon CNM    Assistant: none    Pre-Delivery Diagnosis: Term pregnancy    Post-Delivery Diagnosis: Living  infant(s) or Male    Intrapartum Event: None    Procedure: Spontaneous vaginal delivery    Epidural: YES    Monitor:  Fetal Heart Tones - External and Uterine Contractions - Internal    Indications for instrumental delivery: none    Estimated Blood Loss:     Episiotomy: none    Laceration(s):  none    Laceration(s) repair: NO    Presentation: Cephalic    Fetal Description: aquino    Fetal Position: Right Occiput Anterior    Birth Weight: unavailable    Birth Length: unavailable    Apgar - One Minute: 9    Apgar - Five Minutes: 9    Umbilical Cord: Nuchal Cord x  1    Specimens: cord blood           Complications:  none           Cord Blood Results:   Information for the patient's :  Kayode Escobar [[id number]]   No results found for: PCTABR, PCTDIG, BILI, ABORH    Prenatal Labs:     Lab Results   Component Value Date/Time    ABO/Rh(D) O POSITIVE 2017 11:22 AM    HBsAg, External Negative 2017    HIV, External Negative 2017    Rubella, External Immune 2017    RPR, External Non reactive 2017    Gonorrhea, External Negative 2017    Chlamydia, External Negative 2017    GrBStrep, External Negative 2017   Spontaneous delivery of viable male infant with single nuchal cord easily reduced over fetal had, intact perineum. Spontaneous cry. Delayed cord clamping then double clamped and cord cut by FOB. Infant with parents in room placed skin to skin.     Attending Attestation: I was present and scrubbed for the entire procedure    Signed By:  Henna Moon CNM     2017

## 2017-09-23 NOTE — ANESTHESIA POSTPROCEDURE EVALUATION
9/23/2017  1:56 PM    Laboring Epidural Follow-up Note     Referring physician: Gema Lynch MD   Patient status post vaginal delivery with labor epidural    Visit Vitals    /67 (BP 1 Location: Right arm, BP Patient Position: At rest)    Pulse 68    Temp 36.8 °C (98.2 °F)    Resp 16    Ht 5' 6\" (1.676 m)    Wt 80.3 kg (177 lb)    SpO2 100%    BMI 28.57 kg/m2       Epidural removed by L&D staff  No sedation, pruritis noted. Adequate analgesia.   No obvious anesthesia complications          Yanira Fink CRNA  Signed By: Yanira Fink CRNA    September 23, 2017

## 2017-09-23 NOTE — PROGRESS NOTES
Labor Progress Note  Patient seen, fetal heart rate and contraction pattern evaluated, patient examined. No data found. Physical Exam:  Cervical Exam:  7 cm dilated    90% effaced    -1 station    Membranes:  leaking clear fluid  Uterine Activity: Frequency: Every 2 minutes  Fetal Heart Rate: Reactive    Assessment/Plan:  Reassuring fetal status, Labor  Progressing normally, Continue plan for vaginal delivery.

## 2017-09-23 NOTE — DISCHARGE INSTRUCTIONS
POST DELIVERY DISCHARGE INSTRUCTIONS    Name: Mason Banuelos  YOB: 1980  Primary Diagnosis: Active Problems:    IUP (intrauterine pregnancy), incidental (9/22/2017)        General:     Diet/Diet Restrictions:  Eight 8-ounce glasses of fluid daily (water, juices); avoid excessive caffeine intake. Meals/snacks as desired which are high in fiber and carbohydrates and low in fat and cholesterol. Physical Activity / Restrictions / Safety:     Avoid heavy lifting, no more than the baby alone (not the baby in the car seat). Avoid intercourse until you are seen at your postpartum visit. No douching or tampon use. Check with obstetrician before starting or resuming an exercise program.         Discharge Instructions/Special Treatment/Home Care Needs:     Continue prenatal vitamins. Continue to use squirt bottle with warm water on your perineum after each bathroom use until bleeding stops. Call your doctor for the following:     Fever over 101 degrees by mouth. Vaginal bleeding that soaks two pads per hour for more than one hour. Red streaks or increased swelling of legs, painful red streaks on your breast.  If you feel extremely anxious or overwhelmed. If you have thoughts of harming yourself and/or your baby. Pain Management:     Pain Management:   Take Acetaminophen (Tylenol) or Ibuprofen (Advil, Motrin), as directed for pain. Use a warm Sitz bath 3 times daily to relieve perineal or hemorrhoidal discomfort. For hemorrhoidal discomfort, use Tucks and Anusol cream as needed and directed. Follow-Up Care:     Appointment with MD:   Follow-up Appointments   Procedures    FOLLOW UP VISIT Appointment in: 6 Weeks     Standing Status:   Standing     Number of Occurrences:   1     Order Specific Question:   Appointment in     Answer:   6 Weeks     Telephone number: [phone]  MyChart Activation    Thank you for requesting access to [address]  Please follow the instructions below to securely access and download your online medical record. [employer] allows you to send messages to your doctor, view your test results, renew your prescriptions, schedule appointments, and more. How Do I Sign Up? 1. In your internet browser, go to [email or web address]. 2. Click on the First Time User? Click Here link in the Sign In box. You will see the New Member Sign Up page. 3. Enter your [employer] Access Code exactly as it appears below. You will not need to use this code after youve completed the sign-up process. If you do not sign up before the expiration date, you must request a new code. [employer] Access Code: [email or web address]  Expires: 2017  9:03 PM (This is the date your [employer] access code will )    4. Enter the last four digits of your Social Security Number (xxxx) and Date of Birth (mm/dd/yyyy) as indicated and click Submit. You will be taken to the next sign-up page. 5. Create a [employer] ID. This will be your [employer] login ID and cannot be changed, so think of one that is secure and easy to remember. 6. Create a [employer] password. You can change your password at any time. 7. Enter your Password Reset Question and Answer. This can be used at a later time if you forget your password. 8. Enter your e-mail address. You will receive e-mail notification when new information is available in [address] 9. Click Sign Up. You can now view and download portions of your medical record. 10. Click the Download Summary menu link to download a portable copy of your medical information. Additional Information    If you have questions, please visit the Frequently Asked Questions section of the [employer] website at [email or web address] Remember, [employer] is NOT to be used for urgent needs. For medical emergencies, dial 911. Patient armband removed and given to patient to take home.   Patient was informed of the privacy risks if armband lost or stolen      Signed By: Cedrick Sesay, DARAM

## 2017-09-24 VITALS
TEMPERATURE: 98 F | SYSTOLIC BLOOD PRESSURE: 116 MMHG | OXYGEN SATURATION: 100 % | WEIGHT: 177 LBS | DIASTOLIC BLOOD PRESSURE: 72 MMHG | HEIGHT: 66 IN | BODY MASS INDEX: 28.45 KG/M2 | HEART RATE: 75 BPM | RESPIRATION RATE: 18 BRPM

## 2017-09-24 PROCEDURE — 74011250637 HC RX REV CODE- 250/637: Performed by: NURSE PRACTITIONER

## 2017-09-24 RX ADMIN — IBUPROFEN 800 MG: 400 TABLET, FILM COATED ORAL at 01:17

## 2017-09-24 NOTE — PROGRESS NOTES
Patient discharged in no apparent distress. Patient has all personal belongings. Patient IV access removed and ID bands kept for her and her baby. Discharge teaching reiterated with patient for her and her baby with opportunity for questions provided. Patient has received and understands all discharge instructions and scripts for her and her baby. Baby's security tag will be removed upon leaving the unit. Patient advised to call [phone] [facility] first thing in the morning to make a follow up appointment to continue to monitor bilirubin levels per Dr Lisa Ahn. Patient will be escorted off of unit by staff member and  via wheelchair with baby on lap.

## 2017-09-24 NOTE — ROUTINE PROCESS
Discharge teaching given. Pt verbalizes understanding and have no question at this time. Opportunity to ask question given. 0730- Bedside and Verbal shift change report given to CHARAN Delcid RN (oncoming nurse) by Yelena SOLARES (offgoing nurse). Report included the following information SBAR, Kardex, Intake/Output and MAR.

## 2017-09-24 NOTE — ROUTINE PROCESS
Bedside and Verbal shift change report given to ARMANDO Cruz RN (oncoming nurse) by L. Suzette Sandhoff, RN (offgoing nurse). Report included the following information SBAR, Kardex, Intake/Output, MAR and Recent Results.

## 2022-03-19 PROBLEM — Z33.1 IUP (INTRAUTERINE PREGNANCY), INCIDENTAL: Status: ACTIVE | Noted: 2017-09-22

## 2023-02-01 ENCOUNTER — HOSPITAL ENCOUNTER (OUTPATIENT)
Dept: PHYSICAL THERAPY | Age: 43
Discharge: HOME | End: 2023-02-01
Payer: COMMERCIAL

## 2023-02-01 PROCEDURE — 97535 SELF CARE MNGMENT TRAINING: CPT

## 2023-02-01 PROCEDURE — 97110 THERAPEUTIC EXERCISES: CPT

## 2023-02-01 PROCEDURE — 97162 PT EVAL MOD COMPLEX 30 MIN: CPT

## 2023-02-01 NOTE — PROGRESS NOTES
[facility]  [address] [place], [address] [place]  Ph [phone]  Fx [phone]    Plan of Care/ Statement of Necessity for Physical Therapy Services    Patient name: Zain Alberto Start of Care: 2023   Referral source: Jonah Hayden MD : 1980    Medical Diagnosis: Stress incontinence (female) (male) [N39.3]   Onset Date:2017    Treatment Diagnosis: Stress incontinence (female) (male) [N39.3]   Prior Hospitalization: see medical history Provider#: [id number]   Medications: Verified on Patient summary List    Comorbidities:  5 pregnancy/vaginal with episiotomy   Prior Level of Function: Active lifestyle, no UI          The Plan of Care and following information is based on the information from the initial evaluation. Assessment/ key information:  Patient is a 38year old female presenting to Physical Therapy with c/o stress urinary incontinence which is limiting ability function at previous level. Patient has minimal pain complaints with palpation of the left pubococcygeus and right ischiococcygeus. Patient presents with decreased strength, coordination, and endurance of the pelvic floor muscles and decreased strength of postural stabilizers. Patient also presents with MYRTLE and increased infrasternal angle restrictions. Patient presents with limited understanding of bladder and bowel anatomy/function, dietary irritants, and urge suppression. I feel patient would benefit from skilled therapeutic intervention to optimize highest functional level possible. Patient will continue to benefit from skilled PT services to modify and progress therapeutic interventions, address functional mobility deficits, address ROM deficits, address strength deficits, analyze and address soft tissue restrictions, analyze and cue movement patterns, analyze and modify body mechanics/ergonomics, and assess and modify postural abnormalities to attain remaining goals.     Evaluation Complexity History HIGH Complexity :3+ comorbidities / personal factors will impact the outcome/ POC ; Examination HIGH Complexity : 4+ Standardized tests and measures addressing body structure, function, activity limitation and / or participation in recreation  ;Presentation MEDIUM Complexity : Evolving with changing characteristics  ; Clinical Decision Making MEDIUM Complexity : FOTO score of 26-74 FOTO score = an established functional score where 100 = no disability  Overall Complexity Rating: MEDIUM    Problem List: Decreased pelvic floor mm awareness, Decreased pelvic floor mm strength, and Use of accessory muscles  Treatment Plan may include any combination of the following: Therapeutic exercise, Neuromuscular re-education, Manual therapy, Physical agent/modality, and Patient education  Patient / Family readiness to learn indicated by: asking questions, trying to perform skills, and interest  Persons(s) to be included in education: patient (P)  Barriers to Learning/Limitations: None  Measures taken if barriers to learning: none  Patient Goal (s):  I would like to stop the incontinence  Patient Self Reported Health Status: excellent  Rehabilitation Potential: excellent    Short term goals: To be achieved in 6 weeks:  Patient will demonstrate proper dietary/fluid habits and urge suppression strategies that promote bladder and bowel health to aid in management of urinary urgency and incontinence. Status at eval: Patient consuming insufficient water and unaware of bladder fitness, dietary irritants and urge suppression strategies. Patient will report improvement in UI complaints evidence by a decrease in pad usage and/or amount of leakage by 50% to improve QOL. Status at eval: Patient using 4 pads (pantiliners) per day, generally damp (amount of leakage).  Mostly she changes pantiliners for hygiene not due to leakage     Patient will be able to maintain pelvic floor contraction for 10 seconds, 10 repetitions with no accessory substitution or breath holding to increase pelvic floor endurance to decrease UI  Status at eval: PFMC 8 seconds, 7 repetitions with gluteal substitutions and breath holding     Long term goals: To be achieved in 12 weeks:  Patient will report bladder continence 75% of the time with cough/sneeze/laugh and walking to the toilet. Status at eval: patient stress and urgency incontinence 3-4 times  per week     Patient will demonstrate pelvic floor muscle contraction 4/5 and ability to maintain contraction for 10 seconds, 10 repetitions. Status at eval: PFM 3+/5, 8 second hold, 7 repetitions, with gluteal substitution     Patient will demonstrate improvement of current complaints evidenced by a 9 point  improvement in FOTO, Pelvic functional disability index score  Status at Eval: Pelvic functional disability index 52     Patient will demonstrate independence with management tools and exercise program that are beneficial for current condition in order to feel comfortable with Pelvic floor PT D/C and not fear exacerbation of current condition or symptoms returning. Status at eval : patient is unaware of what exercises to do to decrease the symptoms and unaware of what activities to avoid to avoid exacerbation of current condition  Frequency / Duration: Patient to be seen 1 times per week for 12 weeks. Patient/ Caregiver education and instruction: Diagnosis, prognosis, Proper Voiding Habits, Diet, Exercises, and Bladder Retraining   [x]  Plan of care has been reviewed with PTA    Certification Period: justino Chavez, PT 2/1/2023 5:55 PM    ________________________________________________________________________    I certify that the above Therapy Services are being furnished while the patient is under my care. I agree with the treatment plan and certify that this therapy is necessary.     [address] [place] [address] Signature:____________________  Date:____________Time:____________ Brodie Nyhan, MD      Please sign and return to [facility]  [address] [place], [address] [place]  Ph [phone]  Fx [phone]

## 2023-02-01 NOTE — PROGRESS NOTES
Physical Therapy Evaluation        Patient Name: Matthew Chatman  Date:2023  : 1980  [x]  Patient  Verified  Payor: Olivia Scott / Plan: [address] [place] [address] HMO/CHOICE PLUS/POS / Product Type: HMO /    In time:300  Out time:355  Total Treatment Time (min): 54  Visit #: 1 of 12    Medicare/BCBS Only   Total Timed Codes (min):  55 1:1 Treatment Time:  54       Treatment Area: Stress incontinence (female) (male) [N39.3]    SUBJECTIVE  Pain Level (0-10 scale): 0/10  []constant []intermittent []improving []worsening []no change since onset    Any medication changes, allergies to medications, adverse drug reactions, diagnosis change, or new procedure performed?: [x] No    [] Yes (see summary sheet for update)  Subjective functional status/changes:     PLOF: Active lifestyle, no UI  Limitations to PLOF: 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  Mechanism of Injury: pregnancy  2017  Current symptoms/Complaints: leakage with laugh/cough/sneeze  Previous Treatment/Compliance: none  PMHx/Surgical Hx: 5 pregnancy/vaginal with episiotomy,   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[place] [address]  Living Situation: live with  and 5 children  Pt Goals: I would like to stop the incontinence  Barriers: []pain []financial []time []transportation []other  Motivation: very  Substance use: []Alcohol []Tobacco []other:   Cognition: A & O x 3    Other:    Current urinary complaint  leaks with activity (stress induced)  stress incontinence    Bladder complaint longevity:  5+ years    Bladder symptom progression:  the same maybe better since she started doing Kegels    Frequency of UI: 3-4 times per week    Pad use:  pantiliners: 4- 4 per day    Pad wetness when changed:  damp    Daytime urinary frequency:  Every 4 hour(s) during the day    Nocturia:  1x/ night    Patient has failed previous pelvic floor muscle training?   [] Yes    [x] No    Bowel function:  Regular BM, 5-7 per week  Stool Type ([place]): 4  Toileting position/modifications: none    Fecal Incontinence present? none    Pain complaint:  Right flank    Pain scale:  3/10 at worst - relates this to lack of water    Diet:about 2 servings of fruit or veg, 1 serving of dairy, 1 serving of grains, 3 servings of protein    Fluid intake:    Fluid  Amount per day   Water 64  ( 79 ounces)   Caffeine none   Alcohol none               Other Tests / Comments: MYRTLE assessment: with head raise 3 fingers at umbilicus; 1\" above umbilicus 2 fingers; 1\" below umbilicus 2 fingers; poor tensile force noted and no abdominal doming noted with transfers/head raise     Pelvic Floor Assessment  Patient was educated on pelvic floor anatomy, structure, and function and implications for current presentation of s/s. Patient consents to pelvic floor assessment.        External trigger point/ muscle tenderness:    Superficial PFM tenderness/restriction   Right/center Left   Bulbocavernosus (bulbospongiosus) none none   Ischiocavernosus none none   Superficial Transverse Perineal none none   Perineal body 1-2/10    Levator Ani none none              PFM Screen:    Skin Integrity:  [x] Healthy [] Red  [] Labia Atrophy [] Fragile    Sensation: [x] Intact [] Diminished:    Muscle Bulk: [x] Symmetrical  [] Well-developed [] Atrophied:  []L   []R   []B    Prolapse: None noted  Ability to actively bulge: slight  Bulge with cough slight; bulge no with knack prior to cough    Pelvic floor manual exam: Performed via internal digitalvaginal assessment  female-upon vaginal palpation of the pelvic floor, the following was noted: good pelvic floor tone and tension with no pain upon palpation   Introitus restriction/TTP (reported as hands on a clock):   Mild TTP/restriction: 4,6,8 o'clock no pain        Deep PFM Tenderness/Restriction:    Right/center Left   pubococcygeus none 1/10   Ischiococcygeus 2/10 none   Iliococcygeus none none   Obturator Internus none none coccyx none             Pelvic floor MMT    PERF (Performance/Endurance/Repetitions//Flicks): 0+/1/7//6  gluteal substitution noted    Pelvic muscle release pattern  3 - good release          25 min [x]Eval                  []Re-Eval       10 min Self Care: Review and handout provided on the following: PFM anatomy, structure and function as it pertains to current s/s, complaints and condition. Reviewed expectations for PFPT and POC. Bladder fitness, HEP, KPF margot   Rationale:  Increase awareness and understanding of current condition to improve patients ability to independently and effectively attain goals and progress towards long term management of current condition. 25 min Therapeutic Exercise:  [] See flow sheet :   Rationale: increase ROM, increase strength, and improve coordination to improve the patients ability to maintain continence              With   [] TE   [] TA   [] neuro   [] other: Patient Education: [x] Review HEP    [] Progressed/Changed HEP based on:   [] positioning   [] body mechanics   [] transfers   [] heat/ice application    [] other:           Pain Level (0-10 scale) post treatment: 0/10    ASSESSMENT/Changes in Function: Patient is a 38year old female presenting to Physical Therapy with c/o stress urinary incontinence which is limiting ability function at previous level. Patient has minimal pain complaints with palpation of the left pubococcygeus and right ischiococcygeus. Patient presents with decreased strength, coordination, and endurance of the pelvic floor muscles and decreased strength of postural stabilizers. Patient also presents with MYRTLE and increased infrasternal angle restrictions. Patient presents with limited understanding of bladder and bowel anatomy/function, dietary irritants, and urge suppression. I feel patient would benefit from skilled therapeutic intervention to optimize highest functional level possible.      Patient will continue to benefit from skilled PT services to modify and progress therapeutic interventions, address functional mobility deficits, address ROM deficits, address strength deficits, analyze and address soft tissue restrictions, analyze and cue movement patterns, analyze and modify body mechanics/ergonomics, and assess and modify postural abnormalities to attain remaining goals. [x]  See Plan of Care  []  See progress note/recertification  []  See Discharge Summary         Progress towards goals / Updated goals:  Short term goals: To be achieved in 6 weeks:  Patient will demonstrate proper dietary/fluid habits and urge suppression strategies that promote bladder and bowel health to aid in management of urinary urgency and incontinence. Status at eval: Patient consuming insufficient water and unaware of bladder fitness, dietary irritants and urge suppression strategies. Patient will report improvement in UI complaints evidence by a decrease in pad usage and/or amount of leakage by 50% to improve QOL. Status at eval: Patient using 4 pads (pantiliners) per day, generally damp (amount of leakage). Mostly she changes pantiliners for hygiene not due to leakage    Patient will be able to maintain pelvic floor contraction for 10 seconds, 10 repetitions with no accessory substitution or breath holding to increase pelvic floor endurance to decrease UI  Status at eval: PFMC 8 seconds, 7 repetitions with gluteal substitutions and breath holding    Long term goals: To be achieved in 12 weeks:  Patient will report bladder continence 75% of the time with cough/sneeze/laugh and walking to the toilet. Status at eval: patient stress and urgency incontinence 3-4 times  per week    Patient will demonstrate pelvic floor muscle contraction 4/5 and ability to maintain contraction for 10 seconds, 10 repetitions.    Status at eval: PFM 3+/5, 8 second hold, 7 repetitions, with gluteal substitution    Patient will demonstrate improvement of current complaints evidenced by a 9 point  improvement in FOTO, Pelvic functional disability index score  Status at Eval: Pelvic functional disability index 52    Patient will demonstrate independence with management tools and exercise program that are beneficial for current condition in order to feel comfortable with Pelvic floor PT D/C and not fear exacerbation of current condition or symptoms returning.    Status at eval : patient is unaware of what exercises to do to decrease the symptoms and unaware of what activities to avoid to avoid exacerbation of current condition    PLAN  []  Upgrade activities as tolerated     [x]  Continue plan of care  []  Update interventions per flow sheet       []  Discharge due to:_  []  Other:_      Cristhian Chung, PT 2/1/2023  1:29 PM

## 2023-02-10 ENCOUNTER — HOSPITAL ENCOUNTER (OUTPATIENT)
Dept: PHYSICAL THERAPY | Age: 43
Discharge: HOME | End: 2023-02-10
Payer: COMMERCIAL

## 2023-02-10 PROCEDURE — 97112 NEUROMUSCULAR REEDUCATION: CPT

## 2023-02-10 PROCEDURE — 97110 THERAPEUTIC EXERCISES: CPT

## 2023-02-10 PROCEDURE — 97530 THERAPEUTIC ACTIVITIES: CPT

## 2023-02-10 PROCEDURE — 97535 SELF CARE MNGMENT TRAINING: CPT
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
## 2023-02-17 ENCOUNTER — APPOINTMENT (OUTPATIENT)
Facility: HOSPITAL | Age: 43
End: 2023-02-17
Payer: COMMERCIAL

## 2023-02-17 ENCOUNTER — HOSPITAL ENCOUNTER (OUTPATIENT)
Facility: HOSPITAL | Age: 43
Setting detail: RECURRING SERIES
End: 2023-02-17
Payer: COMMERCIAL

## 2023-02-24 ENCOUNTER — APPOINTMENT (OUTPATIENT)
Facility: HOSPITAL | Age: 43
End: 2023-02-24
Payer: COMMERCIAL

## 2023-02-24 ENCOUNTER — HOSPITAL ENCOUNTER (OUTPATIENT)
Facility: HOSPITAL | Age: 43
Setting detail: RECURRING SERIES
Discharge: HOME | End: 2023-02-27
Payer: COMMERCIAL

## 2023-02-24 PROCEDURE — 97530 THERAPEUTIC ACTIVITIES: CPT

## 2023-02-24 PROCEDURE — 97112 NEUROMUSCULAR REEDUCATION: CPT

## 2023-02-24 PROCEDURE — 97110 THERAPEUTIC EXERCISES: CPT

## 2023-02-24 NOTE — PROGRESS NOTES
PHYSICAL / OCCUPATIONAL THERAPY - DAILY TREATMENT NOTE (updated )    Patient Name: Brii Asa    Date: 2023    : 1980  Insurance: Payor: Cruz Almanza / Plan: Cruz Almanza / Product Type: *No Product type* /      Patient  verified Yes     Visit #   Current / Total 3 12   Time   In / Out [phone]   Pain   In / Out 0/10 0/10   Subjective Functional Status/Changes: Patient reports no leakage in past 3 weeks   Changes to:  Meds, Allergies, Med Hx, Sx Hx? If yes, update Summary List no       TREATMENT AREA =  Stress incontinence (female) (male) [N39.3]    OBJECTIVE        Therapeutic Procedures: Tx Min Billable or 1:1 Min (if diff from Tx Min) Procedure, Rationale, Specifics   25 25 [id number] Neuromuscular Re-Education (timed):  improve balance, coordination, kinesthetic sense, posture, core stability and proprioception to improve patient's ability to develop conscious control of individual muscles and awareness of position of extremities in order to progress to PLOF and address remaining functional goals. (see flow sheet as applicable)     Details if applicable:        [id number] Therapeutic Activity (timed):  use of dynamic activities replicating functional movements to increase ROM, strength, coordination, balance, and proprioception in order to improve patient's ability to progress to PLOF and address remaining functional goals. (see flow sheet as applicable)     Details if applicable:      76381 Therapeutic Exercise (timed):  increase ROM, strength, coordination, balance, and proprioception to improve patient's ability to progress to PLOF and address remaining functional goals.  (see flow sheet as applicable)     Details if applicable:            Details if applicable:            Details if applicable:     53 48 [facility] Totals Reminder: bill using total billable min of TIMED therapeutic procedures (example: do not include dry needle or estim unattended, both untimed codes, in totals to left)  8-22 min = 1 unit; 23-37 min = 2 units; 38-52 min = 3 units; 53-67 min = 4 units; 68-82 min = 5 units   Total Total     [x]  Patient Education billed concurrently with other procedures   [x] Review HEP    [] Progressed/Changed HEP, detail:    [] Other detail:       Objective Information/Functional Measures/Assessment  Biofeedback expectations and implications were reviewed with patient prior to intervention,   Performed sEMG with perianal electrodes as follows:    Position, initial resting tone Work/Rest/Reps Comments   Sitting   10/10/10     Ave Resting at 5.4   uV    Min resting at 1.8    uV    Ave Work at   21.9     uV    Max Work at [employer] interval 16.53 uV   Supine   2/4/10   Ave Resting at 9.3   uV    Min resting at  2.4   [place] Work at   19.6     uV    Max Work at 35.3 uV    W-R interval 10.28 uV    Baseline prior to exercise: Ave resting= 1.1   uV;  Min resting=  0.3 uV; Max resting= 3.5  uV  Baseline after long holds: Ave resting= 3.7 uV ; Min resting=   2.0  uV; Max resting= 9.8 uV      Patient is a 38year old female who has attended 3 physical therapy appointments for urinary incontinence. She reports no UI in past 3 weeks and is no longer using pantiliners. Surface EMG study demonstrated an excellent ability to relax her pelvic floor in supine and good ability in sitting. Patient demonstrates good endurance and decreased coordination of work to relax.   Patient is progressing very well and it is anticipated that she will discharge soon    Patient will continue to benefit from skilled PT / OT services to modify and progress therapeutic interventions, analyze and address functional mobility deficits, analyze and address ROM deficits, analyze and address strength deficits, analyze and address soft tissue restrictions, analyze and cue for proper movement patterns, and analyze and modify for postural abnormalities to address functional deficits and attain remaining goals.    Progress toward goals / Updated goals:  []  See Progress Note/Recertification    Short term goals: To be achieved in 6 weeks:  Patient will demonstrate proper dietary/fluid habits and urge suppression strategies that promote bladder and bowel health to aid in management of urinary urgency and incontinence. Status at eval: Patient consuming insufficient water and unaware of bladder fitness, dietary irritants and urge suppression strategies. Current: patient has cut out chocolate from diet and denies leaks over this past week PROGRESSING 2/10/23  Current:  patient reports drinking about 64 ounces daily. No Change. 2/24/2023     Patient will report improvement in UI complaints evidence by a decrease in pad usage and/or amount of leakage by 50% to improve QOL. Status at eval: Patient using 4 pads (pantiliners) per day, generally damp (amount of leakage). Mostly she changes pantiliners for hygiene not due to leakage  Current:  Patient reports no leakage in three weeks and has not used pantiliners. GOAL MET 2/24/2023     Patient will be able to maintain pelvic floor contraction for 10 seconds, 10 repetitions with no accessory substitution or breath holding to increase pelvic floor endurance to decrease UI  Status at eval: [facility] 8 seconds, 7 repetitions with gluteal substitutions and breath holding  Current: Surface EMG study demonstrated an excellent ability to relax her pelvic floor in supine and good ability in sitting. Patient demonstrates good endurance, good motor unit recruitment, and decreased coordination of work to relax. 2/24/2023 Progressing     Long term goals: To be achieved in 12 weeks:  Patient will report bladder continence 75% of the time with cough/sneeze/laugh and walking to the toilet. Status at eval: patient stress and urgency incontinence 3-4 times  per week  Current:  Patient reports no leakage in three weeks and has not used pantiliners. Will maintain goal until consistent. 2023 Progressing     Patient will demonstrate pelvic floor muscle contraction 4/5 and ability to maintain contraction for 10 seconds, 10 repetitions. Status at eval: PFM 3+/5, 8 second hold, 7 repetitions, with gluteal substitution  Current: Surface EMG study demonstrated an excellent ability to relax her pelvic floor in supine and good ability in sitting. Patient demonstrates good endurance, good motor unit recruitment, and decreased coordination of work to relax. 2023 Progressing     Patient will demonstrate improvement of current complaints evidenced by a 9 point  improvement in FOTO, Pelvic functional disability index score  Status at Eval: Pelvic functional disability index 52  Current: Will test at the 5th appointment. 2023     Patient will demonstrate independence with management tools and exercise program that are beneficial for current condition in order to feel comfortable with Pelvic floor PT D/C and not fear exacerbation of current condition or symptoms returning. Status at eval : patient is unaware of what exercises to do to decrease the symptoms and unaware of what activities to avoid to avoid exacerbation of current condition  Current:  Patient reports good compliance with HEP.   Progressing 2023      Frequency / Duration: Patient to be seen 1 times per week for 9 weeks    PLAN  Yes  Continue plan of care  []  Upgrade activities as tolerated  []  Discharge due to :  []  Other:    Wenceslao Tsai, PT    2023    9:29 AM    Future Appointments   Date Time Provider Gui Osorio   2023 10:00 AM Everson , PT [facility]   3/3/2023 10:00 AM Everson , PT [facility]   3/10/2023 10:00 AM Everson , PT [facility]   3/17/2023 10:30 AM Basilia Lynn PTA [facility]   3/24/2023 10:00 AM Everson Fonda, PT [facility]   3/31/2023 10:00 AM Wenceslao Fonda, PT [facility]

## 2023-02-24 NOTE — PROGRESS NOTES
[facility]  [address] [place], [address] [facility] [place]  Ph [phone]  Fx [phone]    Physical Therapy Progress Note  Patient name: Brii Israel Start of Care: 2023   Referral source: Ron Gómez MD : 1980                Medical Diagnosis: Stress incontinence (female) (male) [N39.3]    Onset Date:2017                Treatment Diagnosis: Stress incontinence (female) (male) [N39.3]   Prior Hospitalization: see medical history Provider#: [id number]   Medications: Verified on Patient summary List    Comorbidities:  5 pregnancy/vaginal with episiotomy   Prior Level of Function: Active lifestyle, no UI    Visits from Start of Care: 3    Missed Visits: 1    Updated Goals/Measure of Progress: To be achieved in 9 weeks:    Short term goals: To be achieved in 4 weeks:  Patient will demonstrate proper dietary/fluid habits and urge suppression strategies that promote bladder and bowel health to aid in management of urinary urgency and incontinence. Status at [facility]: Patient consuming insufficient water and unaware of bladder fitness, dietary irritants and urge suppression strategies. Current: patient has cut out chocolate from diet and denies leaks over this past week PROGRESSING 2/10/23  Current:  patient reports drinking about 64 ounces daily. No Change. 2023     Patient will report improvement in UI complaints evidence by a decrease in pad usage and/or amount of leakage by 50% to improve QOL. Status at [facility]: Patient using 4 pads (pantiliners) per day, generally damp (amount of leakage). Mostly she changes pantiliners for hygiene not due to leakage  Current:  Patient reports no leakage in three weeks and has not used pantiliners.   GOAL MET 2023     Patient will be able to maintain pelvic floor contraction for 10 seconds, 10 repetitions with no accessory substitution or breath holding to increase pelvic floor endurance to decrease UI  Status at [facility]: [facility] 8 seconds, 7 repetitions with gluteal substitutions and breath holding  Current: Surface EMG study demonstrated an excellent ability to relax her pelvic floor in supine and good ability in sitting. Patient demonstrates good endurance, good motor unit recruitment, and decreased coordination of work to relax. 2/24/2023 Progressing     Long term goals: To be achieved in 9 weeks:  Patient will report bladder continence 75% of the time with cough/sneeze/laugh and walking to the toilet. Status at eval: patient stress and urgency incontinence 3-4 times  per week  Current:  Patient reports no leakage in three weeks and has not used pantiliners. Will maintain goal until consistent. 2/24/2023 Progressing     Patient will demonstrate pelvic floor muscle contraction 4/5 and ability to maintain contraction for 10 seconds, 10 repetitions. Status at eval: PFM 3+/5, 8 second hold, 7 repetitions, with gluteal substitution  Current: Surface EMG study demonstrated an excellent ability to relax her pelvic floor in supine and good ability in sitting. Patient demonstrates good endurance, good motor unit recruitment, and decreased coordination of work to relax. 2/24/2023 Progressing        Patient will demonstrate improvement of current complaints evidenced by a 9 point  improvement in FOTO, Pelvic functional disability index score  Status at Eval: Pelvic functional disability index 52  Current: Will test at the 5th appointment. 2/24/2023     Patient will demonstrate independence with management tools and exercise program that are beneficial for current condition in order to feel comfortable with Pelvic floor PT D/C and not fear exacerbation of current condition or symptoms returning. Status at eval : patient is unaware of what exercises to do to decrease the symptoms and unaware of what activities to avoid to avoid exacerbation of current condition  Current:  Patient reports good compliance with HEP.   Progressing 2/24/2023 Summary of Care/ Key Functional Changes:    Patient is a 38year old female who has attended 3 physical therapy appointments for urinary incontinence. She reports no UI in past 3 weeks and is no longer using pantiliners. Surface EMG study demonstrated an excellent ability to relax her pelvic floor in supine and good ability in sitting. Patient demonstrates good endurance and decreased coordination of work to relax. Patient is progressing very well and it is anticipated that she will discharge soon     Patient will continue to benefit from skilled PT / OT services to modify and progress therapeutic interventions, analyze and address functional mobility deficits, analyze and address ROM deficits, analyze and address strength deficits, analyze and address soft tissue restrictions, analyze and cue for proper movement patterns, and analyze and modify for postural abnormalities to address functional deficits and attain remaining goals.          ASSESSMENT/RECOMMENDATIONS:  1Continue therapy per initial plan/protocol at a frequency of  1  x per week for 9 weeks  []Continue therapy with the following recommended changes:_____________________ _____________________________ ________________________________________  []Discontinue therapy progressing towards or have reached established goals  []Discontinue therapy due to lack of appreciable progress towards goals  []Discontinue therapy due to lack of attendance or compliance  []Await Physician's recommendations/decisions regarding therapy  []Other:________________________________________________________________    Thank you for this referral.   Farrah Laurent, PT 2/24/2023 9:37 AM

## 2023-03-03 ENCOUNTER — APPOINTMENT (OUTPATIENT)
Facility: HOSPITAL | Age: 43
End: 2023-03-03
Payer: COMMERCIAL

## 2023-03-03 ENCOUNTER — HOSPITAL ENCOUNTER (OUTPATIENT)
Facility: HOSPITAL | Age: 43
Setting detail: RECURRING SERIES
Discharge: HOME | End: 2023-03-06
Payer: COMMERCIAL

## 2023-03-03 PROCEDURE — 97112 NEUROMUSCULAR REEDUCATION: CPT

## 2023-03-03 PROCEDURE — 97530 THERAPEUTIC ACTIVITIES: CPT

## 2023-03-03 PROCEDURE — 97110 THERAPEUTIC EXERCISES: CPT

## 2023-03-03 NOTE — PROGRESS NOTES
PHYSICAL / OCCUPATIONAL THERAPY - DAILY TREATMENT NOTE (updated )    Patient Name: Melina Nye    Date: 3/3/2023    : 1980  Insurance: Payor: Michael Patrick / Plan: Michael Patrick / Product Type: *No Product type* /      Patient  verified Yes     Visit #   Current / Total 4 12   Time   In / Out 955 1050   Pain   In / Out 0/10 0/10   Subjective Functional Status/Changes: Patient reports not walking and feels that she is losing strength. Changes to:  Meds, Allergies, Med Hx, Sx Hx? If yes, update Summary List no       TREATMENT AREA =  Stress incontinence (female) (male) [N39.3]    OBJECTIVE        Therapeutic Procedures: Tx Min Billable or 1:1 Min (if diff from Tx Min) Procedure, Rationale, Specifics   15 15 94931 Therapeutic Exercise (timed):  increase ROM, strength, coordination, balance, and proprioception to improve patient's ability to progress to PLOF and address remaining functional goals. (see flow sheet as applicable)     Details if applicable:       30 30 87482 Neuromuscular Re-Education (timed):  improve balance, coordination, kinesthetic sense, posture, core stability and proprioception to improve patient's ability to develop conscious control of individual muscles and awareness of position of extremities in order to progress to PLOF and address remaining functional goals. (see flow sheet as applicable)     Details if applicable:     10 10 76764 Therapeutic Activity (timed):  use of dynamic activities replicating functional movements to increase ROM, strength, coordination, balance, and proprioception in order to improve patient's ability to progress to PLOF and address remaining functional goals.   (see flow sheet as applicable)     Details if applicable:            Details if applicable:            Details if applicable:     55 54 100 [facility] Reminder: bill using total billable min of TIMED therapeutic procedures (example: do not include dry needle or estim unattended, both untimed codes, in totals to left)  8-22 min = 1 unit; 23-37 min = 2 units; 38-52 min = 3 units; 53-67 min = 4 units; 68-82 min = 5 units   Total Total     [x]  Patient Education billed concurrently with other procedures   [x] Review HEP    [] Progressed/Changed HEP, detail:    [] Other detail:       Objective Information/Functional Measures/Assessment  Patient tolerated today's session well with no c/o pain. Patient tolerated increased difficulty with TA activation to increase pelvic floor activation and to increase lumbopelvic stability. Patient demonstrated understanding of today's instruction, education, and recommendations. Patient is progressing appropriately towards goals. Patient will continue to benefit from skilled PT / OT services to modify and progress therapeutic interventions, analyze and address functional mobility deficits, analyze and address ROM deficits, analyze and address strength deficits, analyze and address soft tissue restrictions, analyze and cue for proper movement patterns, and analyze and modify for postural abnormalities to address functional deficits and attain remaining goals. Progress toward goals / Updated goals:  []  See Progress Note/Recertification    Short term goals: To be achieved in 4 weeks:  Patient will demonstrate proper dietary/fluid habits and urge suppression strategies that promote bladder and bowel health to aid in management of urinary urgency and incontinence. Status at eval: Patient consuming insufficient water and unaware of bladder fitness, dietary irritants and urge suppression strategies. Current: patient has cut out chocolate from diet and denies leaks over this past week PROGRESSING 2/10/23  Current:  patient reports drinking about 64 ounces daily. No Change. 2/24/2023  Current;  Patient reports drinking about 86 ounces daily.   Goal Met.  3/3/2023     Patient will report improvement in UI complaints evidence by a decrease in pad usage and/or amount of leakage by 50% to improve QOL. Status at eval: Patient using 4 pads (pantiliners) per day, generally damp (amount of leakage). Mostly she changes pantiliners for hygiene not due to leakage  Current:  Patient reports no leakage in three weeks and has not used pantiliners. GOAL MET 2/24/2023     Patient will be able to maintain pelvic floor contraction for 10 seconds, 10 repetitions with no accessory substitution or breath holding to increase pelvic floor endurance to decrease UI  Status at eval: PFMC 8 seconds, 7 repetitions with gluteal substitutions and breath holding  Current: Surface EMG study demonstrated an excellent ability to relax her pelvic floor in supine and good ability in sitting. Patient demonstrates good endurance, good motor unit recruitment, and decreased coordination of work to relax. 2/24/2023 Progressing     Long term goals: To be achieved in 9 weeks:  Patient will report bladder continence 75% of the time with cough/sneeze/laugh and walking to the toilet. Status at eval: patient stress and urgency incontinence 3-4 times  per week  Current:  Patient reports no leakage in three weeks and has not used pantiliners. Will maintain goal until consistent. 2/24/2023 Progressing  Current:  Patient reports having one episode of UI last week while in standing and laughing . Slight regression 3/3/2023     Patient will demonstrate pelvic floor muscle contraction 4/5 and ability to maintain contraction for 10 seconds, 10 repetitions. Status at eval: PFM 3+/5, 8 second hold, 7 repetitions, with gluteal substitution  Current: Surface EMG study demonstrated an excellent ability to relax her pelvic floor in supine and good ability in sitting. Patient demonstrates good endurance, good motor unit recruitment, and decreased coordination of work to relax.  2/24/2023 Progressing        Patient will demonstrate improvement of current complaints evidenced by a 9 point  improvement in FOTO, Pelvic functional disability index score  Status at Eval: Pelvic functional disability index 52  Current: Will test at the 5th appointment. 2/24/2023     Patient will demonstrate independence with management tools and exercise program that are beneficial for current condition in order to feel comfortable with Pelvic floor PT D/C and not fear exacerbation of current condition or symptoms returning. Status at eval : patient is unaware of what exercises to do to decrease the symptoms and unaware of what activities to avoid to avoid exacerbation of current condition  Current:  Patient reports good compliance with HEP.   Progressing 2/24/2023          PLAN  Yes  Continue plan of care  []  Upgrade activities as tolerated  []  Discharge due to :  []  Other:    Murvin Peabody, PT    3/3/2023    7:58 AM    Future Appointments   Date Time Provider Gui Osorio   3/3/2023 10:00 AM Murvin Peabody, PT [facility]   3/10/2023 10:00 AM Murvin Peabody, PT [facility]   3/17/2023 10:30 AM Amber Tsai PTA [facility]   3/24/2023 10:00 AM Murvin Peabody, PT [facility]   3/31/2023 10:00 AM Murvin Peabody, PT [facility]

## 2023-03-10 ENCOUNTER — APPOINTMENT (OUTPATIENT)
Facility: HOSPITAL | Age: 43
End: 2023-03-10
Payer: COMMERCIAL

## 2023-03-10 ENCOUNTER — HOSPITAL ENCOUNTER (OUTPATIENT)
Facility: HOSPITAL | Age: 43
Setting detail: RECURRING SERIES
Discharge: HOME | End: 2023-03-13
Payer: COMMERCIAL

## 2023-03-10 PROCEDURE — 97110 THERAPEUTIC EXERCISES: CPT

## 2023-03-10 PROCEDURE — 97112 NEUROMUSCULAR REEDUCATION: CPT

## 2023-03-10 PROCEDURE — 97530 THERAPEUTIC ACTIVITIES: CPT

## 2023-03-10 NOTE — PROGRESS NOTES
PHYSICAL / OCCUPATIONAL THERAPY - DAILY TREATMENT NOTE (updated )    Patient Name: Aleksandar Hernandez    Date: 3/10/2023    : 1980  Insurance: Payor: Abbey Mcbride / Plan: Abbey Mcbride / Product Type: *No Product type* /      Patient  verified Yes     Visit #   Current / Total 5 12   Time   In / Out 1001 1054   Pain   In / Out 0/10 010   Subjective Functional Status/Changes: Patient reports being ill over the past week. Changes to:  Meds, Allergies, Med Hx, Sx Hx? If yes, update Summary List no       TREATMENT AREA =  Stress incontinence (female) (male) [N39.3]    OBJECTIVE      Therapeutic Procedures: Tx Min Billable or 1:1 Min (if diff from Tx Min) Procedure, Rationale, Specifics   25 25 24878 Therapeutic Exercise (timed):  increase ROM, strength, coordination, balance, and proprioception to improve patient's ability to progress to PLOF and address remaining functional goals. (see flow sheet as applicable)     Details if applicable:       15 15 41056 Neuromuscular Re-Education (timed):  improve balance, coordination, kinesthetic sense, posture, core stability and proprioception to improve patient's ability to develop conscious control of individual muscles and awareness of position of extremities in order to progress to PLOF and address remaining functional goals. (see flow sheet as applicable)     Details if applicable:      Therapeutic Activity (timed):  use of dynamic activities replicating functional movements to increase ROM, strength, coordination, balance, and proprioception in order to improve patient's ability to progress to PLOF and address remaining functional goals.   (see flow sheet as applicable)     Details if applicable:            Details if applicable:            Details if applicable:     53 48 [facility] Totals Reminder: bill using total billable min of TIMED therapeutic procedures (example: do not include dry needle or estim unattended, both untimed codes, in totals to left)  8-22 min = 1 unit; 23-37 min = 2 units; 38-52 min = 3 units; 53-67 min = 4 units; 68-82 min = 5 units   Total Total     TOTAL TREATMENT TIME:        53     [x]  Patient Education billed concurrently with other procedures   [x] Review HEP    [] Progressed/Changed HEP, detail:    [] Other detail:       Objective Information/Functional Measures/Assessment    Patient tolerated today's session well with no c/o pain. Patient tolerated increased difficulty with core stability exercises to increase pelvic floor contraction using the transversus abdominus. Patient demonstrated understanding of today's instruction, education, and recommendations. Patient is progressing appropriately towards goals. Patient will continue to benefit from skilled PT / OT services to modify and progress therapeutic interventions, analyze and address functional mobility deficits, analyze and address ROM deficits, analyze and address strength deficits, analyze and address soft tissue restrictions, analyze and cue for proper movement patterns, and analyze and modify for postural abnormalities to address functional deficits and attain remaining goals. Progress toward goals / Updated goals:  []  See Progress Note/Recertification    Short term goals: To be achieved in 4 weeks:  Patient will demonstrate proper dietary/fluid habits and urge suppression strategies that promote bladder and bowel health to aid in management of urinary urgency and incontinence. Status at eval: Patient consuming insufficient water and unaware of bladder fitness, dietary irritants and urge suppression strategies. Current: patient has cut out chocolate from diet and denies leaks over this past week PROGRESSING 2/10/23  Current:  patient reports drinking about 64 ounces daily. No Change. 2/24/2023  Current;  Patient reports drinking about 86 ounces daily.   Goal Met.  3/3/2023     Patient will report improvement in UI complaints evidence by a decrease in pad usage and/or amount of leakage by 50% to improve QOL.  Status at eval: Patient using 4 pads (pantiliners) per day, generally damp (amount of leakage). Mostly she changes pantiliners for hygiene not due to leakage  Current:  Patient reports no leakage in three weeks and has not used pantiliners.  GOAL MET 2/24/2023     Patient will be able to maintain pelvic floor contraction for 10 seconds, 10 repetitions with no accessory substitution or breath holding to increase pelvic floor endurance to decrease UI  Status at eval: PFMC 8 seconds, 7 repetitions with gluteal substitutions and breath holding  Current: Surface EMG study demonstrated an excellent ability to relax her pelvic floor in supine and good ability in sitting.  Patient demonstrates good endurance, good motor unit recruitment, and decreased coordination of work to relax. 2/24/2023 Progressing     Long term goals: To be achieved in 9 weeks:  Patient will report bladder continence 75% of the time with cough/sneeze/laugh and walking to the toilet.   Status at eval: patient stress and urgency incontinence 3-4 times  per week  Current:  Patient reports no leakage in three weeks and has not used pantiliners.  Will maintain goal until consistent.  2/24/2023 Progressing  Current:  Patient reports having one episode of UI last week while in standing and laughing .   Slight regression 3/3/2023     Patient will demonstrate pelvic floor muscle contraction 4/5 and ability to maintain contraction for 10 seconds, 10 repetitions.   Status at eval: PFM 3+/5, 8 second hold, 7 repetitions, with gluteal substitution  Current: Surface EMG study demonstrated an excellent ability to relax her pelvic floor in supine and good ability in sitting.  Patient demonstrates good endurance, good motor unit recruitment, and decreased coordination of work to relax. 2/24/2023 Progressing        Patient will demonstrate improvement of current complaints evidenced by a 9 point   improvement in FOTO, Pelvic functional disability index score  Status at Eval: Pelvic functional disability index 52  Current: Will test at the 5th appointment. 2/24/2023  Current:  67 GOAL MET 3/10/2023     Patient will demonstrate independence with management tools and exercise program that are beneficial for current condition in order to feel comfortable with Pelvic floor PT D/C and not fear exacerbation of current condition or symptoms returning. Status at eval : patient is unaware of what exercises to do to decrease the symptoms and unaware of what activities to avoid to avoid exacerbation of current condition  Current:  Patient reports good compliance with HEP.   Progressing 2/24/2023    PLAN  Yes  Continue plan of care  []  Upgrade activities as tolerated  []  Discharge due to :  []  Other:    Irasema Castro PT    3/10/2023    7:59 AM    Future Appointments   Date Time Provider Gui Osorio   3/10/2023 10:00 AM Irasema Castro PT [facility]   3/17/2023 10:30 AM Ariana Jennings PTA [facility]   3/24/2023 10:00 AM Irasema Castro PT [facility]   3/31/2023 10:00 AM Irasema Castro PT [facility]

## 2023-03-17 ENCOUNTER — APPOINTMENT (OUTPATIENT)
Facility: HOSPITAL | Age: 43
End: 2023-03-17
Payer: COMMERCIAL

## 2023-03-24 ENCOUNTER — HOSPITAL ENCOUNTER (OUTPATIENT)
Facility: HOSPITAL | Age: 43
Setting detail: RECURRING SERIES
Discharge: HOME | End: 2023-03-27
Payer: COMMERCIAL

## 2023-03-24 ENCOUNTER — APPOINTMENT (OUTPATIENT)
Facility: HOSPITAL | Age: 43
End: 2023-03-24
Payer: COMMERCIAL

## 2023-03-24 PROCEDURE — 97110 THERAPEUTIC EXERCISES: CPT

## 2023-03-24 PROCEDURE — 97112 NEUROMUSCULAR REEDUCATION: CPT

## 2023-03-24 PROCEDURE — 97530 THERAPEUTIC ACTIVITIES: CPT

## 2023-03-24 NOTE — PROGRESS NOTES
deficits and attain remaining goals.    Progress toward goals / Updated goals:  []  See Progress Note/Recertification    Short term goals: To be achieved in 4 weeks:  Patient will demonstrate proper dietary/fluid habits and urge suppression strategies that promote bladder and bowel health to aid in management of urinary urgency and incontinence.  Status at eval: Patient consuming insufficient water and unaware of bladder fitness, dietary irritants and urge suppression strategies.   Current: patient has cut out chocolate from diet and denies leaks over this past week PROGRESSING 2/10/23  Current:  patient reports drinking about 64 ounces daily.  No Change.  2/24/2023  Current;  Patient reports drinking about 86 ounces daily.  Goal Met.  3/3/2023     Patient will report improvement in UI complaints evidence by a decrease in pad usage and/or amount of leakage by 50% to improve QOL.  Status at eval: Patient using 4 pads (pantiliners) per day, generally damp (amount of leakage). Mostly she changes pantiliners for hygiene not due to leakage  Current:  Patient reports no leakage in three weeks and has not used pantiliners.  GOAL MET 2/24/2023     Patient will be able to maintain pelvic floor contraction for 10 seconds, 10 repetitions with no accessory substitution or breath holding to increase pelvic floor endurance to decrease UI  Status at eval: PFMC 8 seconds, 7 repetitions with gluteal substitutions and breath holding  Current: Surface EMG study demonstrated an excellent ability to relax her pelvic floor in supine and good ability in sitting.  Patient demonstrates good endurance, good motor unit recruitment, and decreased coordination of work to relax. 2/24/2023 Progressing  Current: Patient deferred due to being on her menstrual cycle.  On surface EMG (3/24/2023), demonstrated good endurance, excellent relaxation, excellent ability to relax from a work state.  She did have less motor unit recruitment which may be 
per plan of care.      Thank you for this referral.   Tessy Pearl, PT 3/24/2023 8:02 AM

## 2023-03-31 ENCOUNTER — APPOINTMENT (OUTPATIENT)
Facility: HOSPITAL | Age: 43
End: 2023-03-31
Payer: COMMERCIAL

## 2023-03-31 ENCOUNTER — HOSPITAL ENCOUNTER (OUTPATIENT)
Facility: HOSPITAL | Age: 43
Setting detail: RECURRING SERIES
End: 2023-03-31
Payer: COMMERCIAL

## 2023-03-31 PROCEDURE — 97140 MANUAL THERAPY 1/> REGIONS: CPT

## 2023-03-31 PROCEDURE — 97110 THERAPEUTIC EXERCISES: CPT

## 2023-03-31 NOTE — PROGRESS NOTES
[facility] at [address] Antony Torrez, [address] [facility] [place]  Ph [phone]  Fx [phone]    Physical Therapy Discharge Summary    Patient name: Nataliya Quezada Start of Care: 2023   Referral source: Emely Reddy MD : 1980                Medical Diagnosis: Stress incontinence (female) (male) [N39.3]    Onset Date:2017                Treatment Diagnosis: Stress incontinence (female) (male) [N39.3]   Prior Hospitalization: see medical history Provider#: [id number]   Medications: Verified on Patient summary List    Comorbidities:  5 pregnancy/vaginal with episiotomy   Prior Level of Function: Active lifestyle, no UI      Visits from Start of Care: 7    Missed Visits: 0    Reporting Period : 2023 to 3/31/2023    Goals/Measure of Progress:  Short term goals:   Patient will demonstrate proper dietary/fluid habits and urge suppression strategies that promote bladder and bowel health to aid in management of urinary urgency and incontinence. Status at eval: Patient consuming insufficient water and unaware of bladder fitness, dietary irritants and urge suppression strategies. Current: patient has cut out chocolate from diet and denies leaks over this past week PROGRESSING 2/10/23  Current:  patient reports drinking about 64 ounces daily. No Change. 2023  Current;  Patient reports drinking about 86 ounces daily. Goal Met.  3/3/2023     Patient will report improvement in UI complaints evidence by a decrease in pad usage and/or amount of leakage by 50% to improve QOL. Status at eval: Patient using 4 pads (pantiliners) per day, generally damp (amount of leakage). Mostly she changes pantiliners for hygiene not due to leakage  Current:  Patient reports no leakage in three weeks and has not used pantiliners.   GOAL MET 2023        Patient will be able to maintain pelvic floor contraction for 10 seconds, 10 repetitions with no accessory substitution or breath holding to
Physical Therapy Discharge Instructions    [facility] at THE [address] Farideh Maynard, [address] [facility]  Ph [phone]  Fx [phone]      Patient: Mitul Arenas  : 1980      Continue Home Exercise Program 1 times per day for 4 weeks, then decrease to 3 times per week                Follow up with MD:     [] Upon completion of therapy     [x] As needed      Recommendations:     [x]   Return to activity with home program    []   Return to activity with the following modifications:       []Post Rehab Program    []Join Independent aquatic program     []Return to/join local gym                  RAFIQ Enciso 3/31/2023 10:58 AM
excellent ability to relax her pelvic floor in supine and good ability in sitting. Patient demonstrates good endurance, good motor unit recruitment, and decreased coordination of work to relax. 2/24/2023 Progressing  Current: Patient deferred due to being on her menstrual cycle. On surface EMG (3/24/2023), demonstrated good endurance, excellent relaxation, excellent ability to relax from a work state. She did have less motor unit recruitment which may be related to the procedure she had on last Friday. 3/24/2023  Current:  Patient deferred. D/C GOAL 3/31/2023     Long term goals: To be achieved in 6 weeks:  Patient will report bladder continence 75% of the time with cough/sneeze/laugh and walking to the toilet. Status at eval: patient stress and urgency incontinence 3-4 times  per week  Current:  Patient reports no leakage in three weeks and has not used pantiliners. Will maintain goal until consistent. 2/24/2023 Progressing  Current:  Patient reports having one episode of UI last week while in standing and laughing . Slight regression 3/3/2023  Current:  Patient reports no episodes of UI last week. Will maintain until consistent 3/24/2023   Current   Patient reports no UI. GOAL MET  3/31/2023     Patient will demonstrate pelvic floor muscle contraction 4/5 and ability to maintain contraction for 10 seconds, 10 repetitions. Status at eval: PFM 3+/5, 8 second hold, 7 repetitions, with gluteal substitution  Current: Surface EMG study demonstrated an excellent ability to relax her pelvic floor in supine and good ability in sitting. Patient demonstrates good endurance, good motor unit recruitment, and decreased coordination of work to relax. 2/24/2023 Progressing  Current: Patient deferred due to being on her menstrual cycle. On surface EMG (3/24/2023), demonstrated good endurance, excellent relaxation, excellent ability to relax from a work state.   She did have less motor unit recruitment which may be